# Patient Record
Sex: FEMALE | Race: WHITE | HISPANIC OR LATINO | URBAN - METROPOLITAN AREA
[De-identification: names, ages, dates, MRNs, and addresses within clinical notes are randomized per-mention and may not be internally consistent; named-entity substitution may affect disease eponyms.]

---
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
## 2024-05-17 ENCOUNTER — TELEPHONE (OUTPATIENT)
Dept: FAMILY MEDICINE CLINIC | Facility: CLINIC | Age: 19
End: 2024-05-17
Payer: COMMERCIAL

## 2024-05-17 NOTE — TELEPHONE ENCOUNTER
Caller: Cordelia Kingsley    Relationship to patient: Self    Best call back number:      [phone] (Mobile)       Chief complaint: TINGLING IN BODY AND HANDS     Patient directed to call 911 or go to their nearest emergency room.     Patient verbalized understanding: [x] Yes  [] No  If no, why?    Additional notes:  PATIENT SAID SHE WOULD GO TO THE ER TODAY 5-17-24

## 2024-06-11 ENCOUNTER — TELEPHONE (OUTPATIENT)
Dept: FAMILY MEDICINE CLINIC | Facility: CLINIC | Age: 19
End: 2024-06-11

## 2024-06-11 NOTE — TELEPHONE ENCOUNTER
Caller: Cordelia Kingsley    Relationship: Self    Best call back number: [phone]     What was the call regarding: PATIENT WAS SCHEDULED A PHYSICAL WITH KATLIN HEARN BECAUSE DR. ZULETA DID NOT HAVE ANYTHING WITHIN THE PATIENTS TIMEFRAME.

## 2024-06-18 ENCOUNTER — TELEPHONE (OUTPATIENT)
Dept: FAMILY MEDICINE CLINIC | Facility: CLINIC | Age: 19
End: 2024-06-18

## 2024-06-18 NOTE — TELEPHONE ENCOUNTER
Caller: Cordelia Kingsley    Relationship to patient: Self    Best call back number: [phone]    Chief complaint: PHYSICAL    Type of visit: PHYSICAL    Requested date: 06/19/2024     If rescheduling, when is the original appointment: 06/18/2024     Additional notes:PATIENT HAS BEEN SCHEDULED WITH A PROVIDER WHOM IS NOT PCP. PATIENT NEEDED THIS APPOINTMENT BEFORE 06/24/2024

## 2024-06-19 ENCOUNTER — LAB (OUTPATIENT)
Dept: LAB | Facility: HOSPITAL | Age: 19
End: 2024-06-19
Payer: COMMERCIAL

## 2024-06-19 ENCOUNTER — OFFICE VISIT (OUTPATIENT)
Dept: FAMILY MEDICINE CLINIC | Facility: CLINIC | Age: 19
End: 2024-06-19
Payer: COMMERCIAL

## 2024-06-19 VITALS
DIASTOLIC BLOOD PRESSURE: 86 MMHG | SYSTOLIC BLOOD PRESSURE: 118 MMHG | BODY MASS INDEX: 37.11 KG/M2 | WEIGHT: 189 LBS | OXYGEN SATURATION: 98 % | RESPIRATION RATE: 20 BRPM | HEIGHT: 60 IN | HEART RATE: 75 BPM | TEMPERATURE: 98 F

## 2024-06-19 DIAGNOSIS — R53.83 FATIGUE, UNSPECIFIED TYPE: ICD-10-CM

## 2024-06-19 DIAGNOSIS — B35.3 TINEA PEDIS OF BOTH FEET: ICD-10-CM

## 2024-06-19 DIAGNOSIS — R53.83 FATIGUE, UNSPECIFIED TYPE: Primary | ICD-10-CM

## 2024-06-19 DIAGNOSIS — Z11.1 SCREENING FOR TUBERCULOSIS: Primary | ICD-10-CM

## 2024-06-19 DIAGNOSIS — Z11.1 TUBERCULOSIS SCREENING: ICD-10-CM

## 2024-06-19 PROCEDURE — 99213 OFFICE O/P EST LOW 20 MIN: CPT | Performed by: NURSE PRACTITIONER

## 2024-06-19 PROCEDURE — 85652 RBC SED RATE AUTOMATED: CPT

## 2024-06-19 PROCEDURE — 86665 EPSTEIN-BARR CAPSID VCA: CPT

## 2024-06-19 PROCEDURE — 86664 EPSTEIN-BARR NUCLEAR ANTIGEN: CPT

## 2024-06-19 PROCEDURE — 82607 VITAMIN B-12: CPT

## 2024-06-19 PROCEDURE — 82746 ASSAY OF FOLIC ACID SERUM: CPT

## 2024-06-19 PROCEDURE — 86480 TB TEST CELL IMMUN MEASURE: CPT

## 2024-06-19 PROCEDURE — 36415 COLL VENOUS BLD VENIPUNCTURE: CPT

## 2024-06-19 RX ORDER — ETONOGESTREL 68 MG/1
68 IMPLANT SUBCUTANEOUS
COMMUNITY

## 2024-06-19 RX ORDER — CLOTRIMAZOLE AND BETAMETHASONE DIPROPIONATE 10; .64 MG/G; MG/G
1 CREAM TOPICAL 2 TIMES DAILY
Qty: 15 G | Refills: 1 | Status: SHIPPED | OUTPATIENT
Start: 2024-06-19

## 2024-06-19 NOTE — PROGRESS NOTES
Office Note     Name: Cordelia Kingsley    : 2005     MRN: [id number]     Chief Complaint  Annual Exam and Numbness (In extremities.)    Subjective     History of Present Illness:  Cordelia Kingsley is a 19 y.o. female who presents today for her annual physical. The patient reports developing fatigue in 2023. She reports numbness in her hands and lower extremities developing five weeks ago, symptoms wax and wane. Symptoms occur when sitting or lying, not when walking. Additionally, she has felt her entire body was numb three times, occurring when she woke up. Last vaccinations were in 2023. Np risk of pregnancy. Nexplanon implanted since 2021. Symptoms include sweats, chills, fatigue, new onset of diarrhea in the last two weeks, occurring three to four times per day, and mostly extremity numbness with occasional full body numbness. Paperwork completed for a job. The patient reports travel to [place] in the last year.     The patient needs     Review of Systems:   Review of Systems   Constitutional:  Positive for chills, diaphoresis and fatigue. Negative for activity change, appetite change, fever, unexpected weight gain and unexpected weight loss.   HENT: Negative.     Eyes: Negative.    Respiratory: Negative.     Cardiovascular: Negative.    Gastrointestinal:  Positive for diarrhea (diarrhea 3-4 times per day for two weeks).   Endocrine: Negative for polydipsia, polyphagia and polyuria.   Genitourinary: Negative.    Musculoskeletal:  Negative for arthralgias, gait problem, joint swelling, neck pain and neck stiffness.   Skin: Negative.    Allergic/Immunologic: Negative for environmental allergies, food allergies and immunocompromised state.   Neurological:  Positive for numbness. Negative for dizziness, tremors, seizures, syncope, weakness, light-headedness and headache.   Hematological:  Negative for adenopathy. Does not bruise/bleed easily.       Past Medical History: History  reviewed. No pertinent past medical history.    Past Surgical History: History reviewed. No pertinent surgical history.    Immunizations:   Immunization History   Administered Date(s) Administered    COVID-19 (PFIZER) Purple Cap Monovalent 08/17/2021, 09/08/2021    DTaP, Unspecified 2004, 2004, 2004    Fluzone (or Fluarix & Flulaval for VFC) >6mos 09/30/2019    Hep B / HiB 2004, 04/22/2005    Hepatitis B Adult/Adolescent IM 12/04/2023    Hib (PRP-D) 2004    IPV 2004, 2004, 04/22/2005    Meningococcal Conjugate 06/23/2020    PEDS-Pneumococcal Conjugate (PCV7) 2004, 2004, 2004    Pneumococcal Conjugate 13-Valent (PCV13) 2004, 2004, 2004    Pneumococcal Conjugate 20-Valent (PCV20) 12/04/2023        Medications:     Current Outpatient Medications:     Etonogestrel (Nexplanon) 68 MG implant subdermal implant, 1 each., Disp: , Rfl:     clotrimazole-betamethasone (LOTRISONE) 1-0.05 % cream, Apply 1 Application topically to the appropriate area as directed 2 (Two) Times a Day. Apply a thin film on both feet twice daily for two weeks., Disp: 15 g, Rfl: 1    Allergies:   Allergies   Allergen Reactions    Amoxicillin Rash    Penicillin G Rash       Family History:   Family History   Problem Relation Age of Onset    No Known Problems Mother     Diabetes Father     Hypertension Maternal Grandmother     Breast cancer Maternal Grandmother     Diabetes Maternal Grandfather     No Known Problems Paternal Grandmother     No Known Problems Paternal Grandfather        Social History:   Social History     Socioeconomic History    Marital status: Single   Tobacco Use    Smoking status: Never     Passive exposure: Never    Smokeless tobacco: Never   Vaping Use    Vaping status: Never Used   Substance and Sexual Activity    Alcohol use: Never    Drug use: Never    Sexual activity: Never     Birth control/protection: Nexplanon     Comment: pt on birth control to  "regulate periods          Objective     Vital Signs  /86 (BP Location: Left arm, Patient Position: Sitting, Cuff Size: Adult)   Pulse 75   Temp 98 °F (36.7 °C) (Infrared)   Resp 20   Ht 152.4 cm (60\")   Wt 85.7 kg (189 lb)   SpO2 98%   BMI 36.91 kg/m²   Estimated body mass index is 36.91 kg/m² as calculated from the following:    Height as of this encounter: 152.4 cm (60\").    Weight as of this encounter: 85.7 kg (189 lb).          Physical Exam  Vitals and nursing note reviewed.   Constitutional:       General: She is not in acute distress.     Appearance: Normal appearance. She is not ill-appearing or toxic-appearing.   HENT:      Head: Normocephalic and atraumatic.      Right Ear: Tympanic membrane, ear canal and external ear normal.      Left Ear: Tympanic membrane, ear canal and external ear normal.      Nose: Nose normal. No congestion or rhinorrhea.      Mouth/Throat:      Mouth: Mucous membranes are moist.      Pharynx: Oropharynx is clear.   Eyes:      General: No scleral icterus.        Right eye: No discharge.         Left eye: No discharge.      Conjunctiva/sclera: Conjunctivae normal.      Pupils: Pupils are equal, round, and reactive to light.   Cardiovascular:      Rate and Rhythm: Normal rate and regular rhythm.      Heart sounds: Normal heart sounds.   Pulmonary:      Effort: Pulmonary effort is normal.      Breath sounds: Normal breath sounds.   Abdominal:      General: Bowel sounds are normal. There is no distension.      Palpations: Abdomen is soft. There is no mass.      Tenderness: There is no abdominal tenderness. There is no guarding or rebound.      Hernia: No hernia is present.   Musculoskeletal:         General: Normal range of motion.      Cervical back: Normal range of motion and neck supple. No rigidity or tenderness.   Lymphadenopathy:      Cervical: No cervical adenopathy.   Skin:     General: Skin is warm.      Comments: The patient has peeling skin on the bottom of the " toes and between the toes.    Neurological:      General: No focal deficit present.      Mental Status: She is alert.   Psychiatric:         Mood and Affect: Mood normal.         Behavior: Behavior normal.         Thought Content: Thought content normal.         Judgment: Judgment normal.          Assessment and Plan     Procedures      Lab Results (last 72 hours)       Procedure Component Value Units Date/Time    Vitamin B12 [[id number]]  (Normal) Collected: 06/19/24 1346    Specimen: Blood Updated: 06/20/24 0027     Vitamin B-12 410 pg/mL     Narrative:      Results may be falsely increased if patient taking Biotin.      Folate [[id number]]  (Normal) Collected: 06/19/24 1346    Specimen: Blood Updated: 06/20/24 0027     Folate 8.14 ng/mL     Narrative:      Results may be falsely increased if patient taking Biotin.      Sedimentation rate, automated [[id number]]  (Normal) Collected: 06/19/24 1346    Specimen: Blood Updated: 06/20/24 0037     Sed Rate 13 mm/hr     William-Barr Virus VCA, IgM [[id number]] Collected: 06/19/24 1346    Specimen: Blood Updated: 06/19/24 1346    William-Barr Virus Nuclear Antigen Antibody, IgG [[id number]] Collected: 06/19/24 1346    Specimen: Blood Updated: 06/19/24 1346    QuantiFERON-TB Gold Plus [[id number]] Collected: 06/19/24 1346    Specimen: Blood Updated: 06/19/24 1346                  Diagnoses and all orders for this visit:    1. Fatigue, unspecified type (Primary)  -     Vitamin B12; Future  -     Folate; Future  -     Sedimentation rate, automated; Future  -     William-Barr Virus VCA, IgM; Future  -     William-Barr Virus Nuclear Antigen Antibody, IgG; Future    2. Tuberculosis screening  -     Cancel: QuantiFERON TB Gold; Future    3. Tinea pedis of both feet  -     clotrimazole-betamethasone (LOTRISONE) 1-0.05 % cream; Apply 1 Application topically to the appropriate area as directed 2 (Two) Times a Day. Apply a thin film on both feet twice daily for two weeks.  Dispense: 15 g;  Refill: 1    Reviewed exam findings with the patient and mom. The patient is agreeable to proceed with a lab draw.  Will notify her of lab results. The patient has increased heart rate while sitting during Orthostatic readings. Consider Holter monitor/echo as indicated for persistent symptoms. Keep your routine follow up appointment with Dr. Tavera.        Follow Up  Return if symptoms worsen or fail to improve.    [facility]  [address] [place] [id number] [phone]

## 2024-06-20 LAB
ERYTHROCYTE [SEDIMENTATION RATE] IN BLOOD: 13 MM/HR (ref 0–20)
FOLATE SERPL-MCNC: 8.14 NG/ML (ref 4.78–24.2)
VIT B12 BLD-MCNC: 410 PG/ML (ref 211–946)

## 2024-06-22 LAB
EBV NA IGG SER IA-ACNC: >600 U/ML (ref 0–17.9)
EBV VCA IGM SER IA-ACNC: <36 U/ML (ref 0–35.9)
GAMMA INTERFERON BACKGROUND BLD IA-ACNC: 0.03 IU/ML
M TB IFN-G BLD-IMP: NEGATIVE
M TB IFN-G CD4+ BCKGRND COR BLD-ACNC: 0.05 IU/ML
M TB IFN-G CD4+CD8+ BCKGRND COR BLD-ACNC: 0.04 IU/ML
MITOGEN IGNF BCKGRD COR BLD-ACNC: >10 IU/ML
QUANTIFERON INCUBATION: NORMAL
SERVICE CMNT-IMP: NORMAL

## 2024-06-24 ENCOUNTER — TELEPHONE (OUTPATIENT)
Dept: FAMILY MEDICINE CLINIC | Facility: CLINIC | Age: 19
End: 2024-06-24
Payer: COMMERCIAL

## 2024-06-24 NOTE — TELEPHONE ENCOUNTER
----- Message from Tae Rivera sent at 6/24/2024 12:45 PM EDT -----  I attempted to call the patient but got her voicemail. Please notify the patient that her Quantiferon TB is negative. Her EBV IGM is also negative. This test checks for acute mononucleosis. Her EBV IGG is positive, positive for past mononucleosis infection.

## 2024-06-24 NOTE — TELEPHONE ENCOUNTER
HUB RELAY    I attempted to call the patient but got her voicemail. Please notify the patient that her Quantiferon TB is negative. Her EBV IGM is also negative. This test checks for acute mononucleosis. Her EBV IGG is positive, positive for past mononucleosis infection.

## 2024-06-24 NOTE — TELEPHONE ENCOUNTER
Name: Cordelia Kingsley    Relationship: Self    Best Callback Number: [phone]     HUB PROVIDED THE RELAY MESSAGE FROM THE OFFICE   PATIENT VOICED UNDERSTANDING AND HAS NO FURTHER QUESTIONS AT THIS TIME    ADDITIONAL INFORMATION:

## 2024-06-24 NOTE — TELEPHONE ENCOUNTER
Caller: Cordelia Kingsley    Relationship: Self    Best call back number: [phone]     What was the call regarding:   PATIENT WAS SEEN IN THE OFFICE 06/19/2024 AND WOULD LIKE A CALL BACK TO BE INFORMED OF THE STATUS OF HER FORM BEING COMPLETED FOR HER TO BE ABLE TO START

## 2025-02-19 ENCOUNTER — OFFICE VISIT (OUTPATIENT)
Dept: FAMILY MEDICINE CLINIC | Facility: CLINIC | Age: 20
End: 2025-02-19
Payer: COMMERCIAL

## 2025-02-19 ENCOUNTER — LAB (OUTPATIENT)
Dept: LAB | Facility: HOSPITAL | Age: 20
End: 2025-02-19
Payer: COMMERCIAL

## 2025-02-19 VITALS
OXYGEN SATURATION: 98 % | RESPIRATION RATE: 19 BRPM | BODY MASS INDEX: 36.99 KG/M2 | DIASTOLIC BLOOD PRESSURE: 84 MMHG | SYSTOLIC BLOOD PRESSURE: 128 MMHG | HEART RATE: 77 BPM | TEMPERATURE: 98 F | WEIGHT: 189.4 LBS

## 2025-02-19 DIAGNOSIS — R20.0 NUMBNESS AND TINGLING IN BOTH HANDS: ICD-10-CM

## 2025-02-19 DIAGNOSIS — E28.2 PCOS (POLYCYSTIC OVARIAN SYNDROME): Primary | ICD-10-CM

## 2025-02-19 DIAGNOSIS — R20.2 NUMBNESS AND TINGLING IN BOTH HANDS: ICD-10-CM

## 2025-02-19 DIAGNOSIS — L50.3 URTICARIA, DERMATOGRAPHIC: ICD-10-CM

## 2025-02-19 DIAGNOSIS — E28.2 PCOS (POLYCYSTIC OVARIAN SYNDROME): ICD-10-CM

## 2025-02-19 DIAGNOSIS — G56.03 BILATERAL CARPAL TUNNEL SYNDROME: ICD-10-CM

## 2025-02-19 LAB
ALBUMIN SERPL-MCNC: 4.2 G/DL (ref 3.5–5.2)
ALBUMIN UR-MCNC: 3 MG/DL
ALBUMIN/GLOB SERPL: 1.4 G/DL
ALP SERPL-CCNC: 80 U/L (ref 39–117)
ALT SERPL W P-5'-P-CCNC: 43 U/L (ref 1–33)
ANION GAP SERPL CALCULATED.3IONS-SCNC: 11 MMOL/L (ref 5–15)
AST SERPL-CCNC: 30 U/L (ref 1–32)
BASOPHILS # BLD AUTO: 0.04 10*3/MM3 (ref 0–0.2)
BASOPHILS NFR BLD AUTO: 0.5 % (ref 0–1.5)
BILIRUB SERPL-MCNC: 0.5 MG/DL (ref 0–1.2)
BUN SERPL-MCNC: 7 MG/DL (ref 6–20)
BUN/CREAT SERPL: 10.4 (ref 7–25)
CALCIUM SPEC-SCNC: 9.1 MG/DL (ref 8.6–10.5)
CHLORIDE SERPL-SCNC: 104 MMOL/L (ref 98–107)
CO2 SERPL-SCNC: 24 MMOL/L (ref 22–29)
CREAT SERPL-MCNC: 0.67 MG/DL (ref 0.57–1)
DEPRECATED RDW RBC AUTO: 41 FL (ref 37–54)
EGFRCR SERPLBLD CKD-EPI 2021: 128.5 ML/MIN/1.73
EOSINOPHIL # BLD AUTO: 0.11 10*3/MM3 (ref 0–0.4)
EOSINOPHIL NFR BLD AUTO: 1.3 % (ref 0.3–6.2)
ERYTHROCYTE [DISTWIDTH] IN BLOOD BY AUTOMATED COUNT: 12.6 % (ref 12.3–15.4)
GLOBULIN UR ELPH-MCNC: 2.9 GM/DL
GLUCOSE SERPL-MCNC: 107 MG/DL (ref 65–99)
HBA1C MFR BLD: 6 % (ref 4.8–5.6)
HCT VFR BLD AUTO: 40.6 % (ref 34–46.6)
HGB BLD-MCNC: 14 G/DL (ref 12–15.9)
IMM GRANULOCYTES # BLD AUTO: 0.02 10*3/MM3 (ref 0–0.05)
IMM GRANULOCYTES NFR BLD AUTO: 0.2 % (ref 0–0.5)
LYMPHOCYTES # BLD AUTO: 2.71 10*3/MM3 (ref 0.7–3.1)
LYMPHOCYTES NFR BLD AUTO: 31 % (ref 19.6–45.3)
MCH RBC QN AUTO: 30.8 PG (ref 26.6–33)
MCHC RBC AUTO-ENTMCNC: 34.5 G/DL (ref 31.5–35.7)
MCV RBC AUTO: 89.4 FL (ref 79–97)
MONOCYTES # BLD AUTO: 0.47 10*3/MM3 (ref 0.1–0.9)
MONOCYTES NFR BLD AUTO: 5.4 % (ref 5–12)
NEUTROPHILS NFR BLD AUTO: 5.4 10*3/MM3 (ref 1.7–7)
NEUTROPHILS NFR BLD AUTO: 61.6 % (ref 42.7–76)
NRBC BLD AUTO-RTO: 0 /100 WBC (ref 0–0.2)
PLATELET # BLD AUTO: 375 10*3/MM3 (ref 140–450)
PMV BLD AUTO: 11.8 FL (ref 6–12)
POTASSIUM SERPL-SCNC: 3.5 MMOL/L (ref 3.5–5.2)
PROT SERPL-MCNC: 7.1 G/DL (ref 6–8.5)
RBC # BLD AUTO: 4.54 10*6/MM3 (ref 3.77–5.28)
SODIUM SERPL-SCNC: 139 MMOL/L (ref 136–145)
TSH SERPL DL<=0.05 MIU/L-ACNC: 2.12 UIU/ML (ref 0.27–4.2)
VIT B12 BLD-MCNC: 418 PG/ML (ref 211–946)
WBC NRBC COR # BLD AUTO: 8.75 10*3/MM3 (ref 3.4–10.8)

## 2025-02-19 PROCEDURE — 82043 UR ALBUMIN QUANTITATIVE: CPT

## 2025-02-19 PROCEDURE — 83036 HEMOGLOBIN GLYCOSYLATED A1C: CPT

## 2025-02-19 PROCEDURE — 82607 VITAMIN B-12: CPT

## 2025-02-19 PROCEDURE — 80050 GENERAL HEALTH PANEL: CPT

## 2025-02-19 PROCEDURE — 99214 OFFICE O/P EST MOD 30 MIN: CPT | Performed by: FAMILY MEDICINE

## 2025-02-19 RX ORDER — NAPROXEN 500 MG/1
500 TABLET ORAL
COMMUNITY
Start: 2025-01-27

## 2025-02-19 RX ORDER — NORGESTIMATE AND ETHINYL ESTRADIOL 0.25-0.035
KIT ORAL
COMMUNITY
Start: 2025-01-28

## 2025-02-19 RX ORDER — FAMOTIDINE 20 MG/1
20 TABLET, FILM COATED ORAL 2 TIMES DAILY
Qty: 30 TABLET | Refills: 3 | Status: SHIPPED | OUTPATIENT
Start: 2025-02-19

## 2025-02-19 RX ORDER — CETIRIZINE HYDROCHLORIDE 10 MG/1
10 TABLET ORAL DAILY
Qty: 30 TABLET | Refills: 3 | Status: SHIPPED | OUTPATIENT
Start: 2025-02-19

## 2025-02-19 NOTE — PROGRESS NOTES
Subjective   Cordelia Kingsley is a 19 y.o. female.     History of Present Illness hives come and goes since was started on spirnolactone.  She has tried benadryl as needed.  Makes her sleepy.  She has been off for .  Linear hives and scratches.  Tingling sensation in fingers.  Also pain in right arm and leg.  She saw gyn and was diagnosed with pcos.  She is also always tired.  She has been taking sprintec since October.  Started spirolactone 1 month ago.  No shortness of breath or tongue or lip swelling.  She is also fatigued despite 8 hours of sleep.  Unsure if she snores.  Has been on ocp for irreg periods. No change in ocp.      The following portions of the patient's history were reviewed and updated as appropriate: allergies, current medications, past family history, past medical history, past social history, past surgical history, and problem list.    Review of Systems   Constitutional:  Positive for fatigue. Negative for chills, fever and unexpected weight change.   HENT:  Negative for congestion, ear pain, nosebleeds, rhinorrhea, sinus pressure, sneezing, sore throat and trouble swallowing.    Eyes:  Negative for itching and visual disturbance.   Respiratory:  Negative for cough, chest tightness, shortness of breath and wheezing.    Cardiovascular:  Negative for chest pain, palpitations and leg swelling.   Gastrointestinal:  Negative for abdominal pain, anal bleeding, blood in stool, constipation, diarrhea, nausea and vomiting.   Endocrine: Negative for cold intolerance, heat intolerance, polydipsia and polyuria.   Genitourinary:  Negative for difficulty urinating, frequency, hematuria and urgency.   Musculoskeletal:  Negative for back pain, gait problem and myalgias.   Skin:  Positive for rash. Negative for wound.   Neurological:  Positive for numbness. Negative for dizziness, weakness and headaches.   Hematological:  Negative for adenopathy. Does not bruise/bleed easily.   Psychiatric/Behavioral:  Negative  for agitation, confusion, decreased concentration and suicidal ideas. The patient is not nervous/anxious.        Objective     Vitals:    02/19/25 1255   BP: 128/84   BP Location: Left arm   Patient Position: Sitting   Cuff Size: Adult   Pulse: 77   Resp: 19   Temp: 98 °F (36.7 °C)   TempSrc: Infrared   SpO2: 98%   Weight: 85.9 kg (189 lb 6.4 oz)       Physical Exam  Vitals and nursing note reviewed.   Constitutional:       General: She is not in acute distress.     Appearance: She is well-developed. She is not diaphoretic.   HENT:      Head: Normocephalic and atraumatic.      Right Ear: External ear normal.      Left Ear: External ear normal.   Eyes:      General: Lids are normal. No scleral icterus.        Right eye: No discharge.         Left eye: No discharge.      Conjunctiva/sclera: Conjunctivae normal.      Pupils: Pupils are equal, round, and reactive to light.   Neck:      Thyroid: No thyroid mass or thyromegaly.      Vascular: No carotid bruit or JVD.      Trachea: No tracheal deviation.   Cardiovascular:      Rate and Rhythm: Normal rate and regular rhythm.      Heart sounds: Normal heart sounds. No murmur heard.     No friction rub. No gallop.   Pulmonary:      Effort: Pulmonary effort is normal. No respiratory distress.      Breath sounds: Normal breath sounds. No decreased breath sounds, wheezing, rhonchi or rales.   Chest:      Chest wall: No tenderness.   Abdominal:      General: Bowel sounds are normal. There is no distension.      Palpations: Abdomen is soft. There is no mass.      Tenderness: There is no abdominal tenderness. There is no guarding or rebound.      Hernia: No hernia is present.   Musculoskeletal:         General: No tenderness, deformity or signs of injury. Normal range of motion.      Comments: Tinnel + bilaterally.     Lymphadenopathy:      Cervical: No cervical adenopathy.      Upper Body:      Right upper body: No supraclavicular adenopathy.      Left upper body: No  supraclavicular adenopathy.   Skin:     Findings: No bruising, erythema or rash.      Nails: There is no clubbing.   Neurological:      Mental Status: She is alert and oriented to person, place, and time.      Cranial Nerves: No cranial nerve deficit.      Deep Tendon Reflexes: Reflexes are normal and symmetric. Reflexes normal.   Psychiatric:         Speech: Speech normal.         Behavior: Behavior normal.         Thought Content: Thought content normal.         Judgment: Judgment normal.         Assessment & Plan     Problem List Items Addressed This Visit          Endocrine and Metabolic    PCOS (polycystic ovarian syndrome) - Primary    Relevant Orders    CBC & Differential    TSH    Comprehensive Metabolic Panel    Hemoglobin A1c    MicroAlbumin, Urine, Random - Urine, Clean Catch    Lipid Panel    Vitamin D,25-Hydroxy       Neuro    Bilateral carpal tunnel syndrome       Skin    Urticaria, dermatographic    Relevant Medications    famotidine (Pepcid) 20 MG tablet    cetirizine (zyrTEC) 10 MG tablet    Other Relevant Orders    Ambulatory Referral to Allergy       Symptoms and Signs    Numbness and tingling in both hands    Relevant Orders    Vitamin B12     We may need to given some time for spirnolactone to clear system.    Consider hormonal   Start pepcid and zyrtec continue benadryl as needed.    Check fasting labs.    Carpal tunnel splints.   Check labs b12 vit d.  Check A1c electrolytes. Thyroid.   Referral allergy  Could be hormonal related.

## 2025-03-07 ENCOUNTER — LAB (OUTPATIENT)
Dept: LAB | Facility: HOSPITAL | Age: 20
End: 2025-03-07
Payer: COMMERCIAL

## 2025-03-07 ENCOUNTER — TRANSCRIBE ORDERS (OUTPATIENT)
Dept: LAB | Facility: HOSPITAL | Age: 20
End: 2025-03-07
Payer: COMMERCIAL

## 2025-03-07 DIAGNOSIS — L50.1 IDIOPATHIC URTICARIA: Primary | ICD-10-CM

## 2025-03-07 DIAGNOSIS — L50.8 AQUAGENIC URTICARIA: ICD-10-CM

## 2025-03-07 LAB
ALBUMIN SERPL-MCNC: 4.2 G/DL (ref 3.5–5.2)
ALBUMIN/GLOB SERPL: 1.6 G/DL
ALP SERPL-CCNC: 75 U/L (ref 39–117)
ALT SERPL W P-5'-P-CCNC: 93 U/L (ref 1–33)
ANION GAP SERPL CALCULATED.3IONS-SCNC: 12 MMOL/L (ref 5–15)
AST SERPL-CCNC: 49 U/L (ref 1–32)
BASOPHILS # BLD AUTO: 0.04 10*3/MM3 (ref 0–0.2)
BASOPHILS NFR BLD AUTO: 0.5 % (ref 0–1.5)
BILIRUB SERPL-MCNC: 0.3 MG/DL (ref 0–1.2)
BUN SERPL-MCNC: 7 MG/DL (ref 6–20)
BUN/CREAT SERPL: 11.5 (ref 7–25)
CALCIUM SPEC-SCNC: 9.1 MG/DL (ref 8.6–10.5)
CHLORIDE SERPL-SCNC: 105 MMOL/L (ref 98–107)
CHROMATIN AB SERPL-ACNC: <10 IU/ML (ref 0–14)
CO2 SERPL-SCNC: 24 MMOL/L (ref 22–29)
CREAT SERPL-MCNC: 0.61 MG/DL (ref 0.57–1)
CRP SERPL-MCNC: 0.64 MG/DL (ref 0–0.5)
DEPRECATED RDW RBC AUTO: 42.5 FL (ref 37–54)
EGFRCR SERPLBLD CKD-EPI 2021: 131.4 ML/MIN/1.73
EOSINOPHIL # BLD AUTO: 0.19 10*3/MM3 (ref 0–0.4)
EOSINOPHIL NFR BLD AUTO: 2.3 % (ref 0.3–6.2)
ERYTHROCYTE [DISTWIDTH] IN BLOOD BY AUTOMATED COUNT: 12.6 % (ref 12.3–15.4)
ERYTHROCYTE [SEDIMENTATION RATE] IN BLOOD: 9 MM/HR (ref 0–20)
GLOBULIN UR ELPH-MCNC: 2.7 GM/DL
GLUCOSE SERPL-MCNC: 93 MG/DL (ref 65–99)
HCT VFR BLD AUTO: 40.7 % (ref 34–46.6)
HGB BLD-MCNC: 13.7 G/DL (ref 12–15.9)
IMM GRANULOCYTES # BLD AUTO: 0.02 10*3/MM3 (ref 0–0.05)
IMM GRANULOCYTES NFR BLD AUTO: 0.2 % (ref 0–0.5)
LYMPHOCYTES # BLD AUTO: 3.03 10*3/MM3 (ref 0.7–3.1)
LYMPHOCYTES NFR BLD AUTO: 37.2 % (ref 19.6–45.3)
MCH RBC QN AUTO: 31 PG (ref 26.6–33)
MCHC RBC AUTO-ENTMCNC: 33.7 G/DL (ref 31.5–35.7)
MCV RBC AUTO: 92.1 FL (ref 79–97)
MONOCYTES # BLD AUTO: 0.37 10*3/MM3 (ref 0.1–0.9)
MONOCYTES NFR BLD AUTO: 4.5 % (ref 5–12)
NEUTROPHILS NFR BLD AUTO: 4.49 10*3/MM3 (ref 1.7–7)
NEUTROPHILS NFR BLD AUTO: 55.3 % (ref 42.7–76)
NRBC BLD AUTO-RTO: 0 /100 WBC (ref 0–0.2)
PLATELET # BLD AUTO: 349 10*3/MM3 (ref 140–450)
PMV BLD AUTO: 11.7 FL (ref 6–12)
POTASSIUM SERPL-SCNC: 4 MMOL/L (ref 3.5–5.2)
PROT SERPL-MCNC: 6.9 G/DL (ref 6–8.5)
RBC # BLD AUTO: 4.42 10*6/MM3 (ref 3.77–5.28)
SODIUM SERPL-SCNC: 141 MMOL/L (ref 136–145)
T3 SERPL-MCNC: 178 NG/DL (ref 80–200)
T4 SERPL-MCNC: 11.7 MCG/DL (ref 4.5–11.7)
TSH SERPL DL<=0.05 MIU/L-ACNC: 1.57 UIU/ML (ref 0.27–4.2)
WBC NRBC COR # BLD AUTO: 8.14 10*3/MM3 (ref 3.4–10.8)

## 2025-03-07 PROCEDURE — 84436 ASSAY OF TOTAL THYROXINE: CPT | Performed by: NURSE PRACTITIONER

## 2025-03-07 PROCEDURE — 84480 ASSAY TRIIODOTHYRONINE (T3): CPT | Performed by: NURSE PRACTITIONER

## 2025-03-07 PROCEDURE — 86800 THYROGLOBULIN ANTIBODY: CPT | Performed by: NURSE PRACTITIONER

## 2025-03-07 PROCEDURE — 86038 ANTINUCLEAR ANTIBODIES: CPT | Performed by: NURSE PRACTITIONER

## 2025-03-07 PROCEDURE — 86431 RHEUMATOID FACTOR QUANT: CPT | Performed by: NURSE PRACTITIONER

## 2025-03-07 PROCEDURE — 86140 C-REACTIVE PROTEIN: CPT | Performed by: NURSE PRACTITIONER

## 2025-03-07 PROCEDURE — 80050 GENERAL HEALTH PANEL: CPT | Performed by: NURSE PRACTITIONER

## 2025-03-07 PROCEDURE — 36415 COLL VENOUS BLD VENIPUNCTURE: CPT | Performed by: NURSE PRACTITIONER

## 2025-03-07 PROCEDURE — 86352 CELL FUNCTION ASSAY W/STIM: CPT | Performed by: NURSE PRACTITIONER

## 2025-03-07 PROCEDURE — 86376 MICROSOMAL ANTIBODY EACH: CPT | Performed by: NURSE PRACTITIONER

## 2025-03-07 PROCEDURE — 82785 ASSAY OF IGE: CPT | Performed by: NURSE PRACTITIONER

## 2025-03-07 PROCEDURE — 85652 RBC SED RATE AUTOMATED: CPT | Performed by: NURSE PRACTITIONER

## 2025-03-10 LAB
ANA SER QL: NEGATIVE
THYROGLOB AB SERPL-ACNC: <1 IU/ML (ref 0–0.9)
THYROPEROXIDASE AB SERPL-ACNC: 11 IU/ML (ref 0–34)

## 2025-03-12 ENCOUNTER — RESULTS FOLLOW-UP (OUTPATIENT)
Dept: FAMILY MEDICINE CLINIC | Facility: CLINIC | Age: 20
End: 2025-03-12

## 2025-03-14 LAB — IGE SERPL-ACNC: 198 IU/ML (ref 6–495)

## 2025-03-18 LAB — FC EPSILON RI + RII AB SER-ACNC: 6.6

## 2025-04-02 ENCOUNTER — OFFICE VISIT (OUTPATIENT)
Dept: FAMILY MEDICINE CLINIC | Facility: CLINIC | Age: 20
End: 2025-04-02
Payer: COMMERCIAL

## 2025-04-02 ENCOUNTER — LAB (OUTPATIENT)
Dept: LAB | Facility: HOSPITAL | Age: 20
End: 2025-04-02
Payer: COMMERCIAL

## 2025-04-02 VITALS
WEIGHT: 186 LBS | OXYGEN SATURATION: 98 % | SYSTOLIC BLOOD PRESSURE: 112 MMHG | TEMPERATURE: 98.6 F | BODY MASS INDEX: 36.52 KG/M2 | DIASTOLIC BLOOD PRESSURE: 70 MMHG | HEIGHT: 60 IN | HEART RATE: 86 BPM

## 2025-04-02 DIAGNOSIS — E66.812 CLASS 2 OBESITY DUE TO EXCESS CALORIES WITHOUT SERIOUS COMORBIDITY WITH BODY MASS INDEX (BMI) OF 36.0 TO 36.9 IN ADULT: Primary | ICD-10-CM

## 2025-04-02 DIAGNOSIS — G89.29 CHRONIC PAIN OF BOTH KNEES: ICD-10-CM

## 2025-04-02 DIAGNOSIS — Z51.81 THERAPEUTIC DRUG MONITORING: ICD-10-CM

## 2025-04-02 DIAGNOSIS — E66.09 CLASS 2 OBESITY DUE TO EXCESS CALORIES WITHOUT SERIOUS COMORBIDITY WITH BODY MASS INDEX (BMI) OF 36.0 TO 36.9 IN ADULT: Primary | ICD-10-CM

## 2025-04-02 DIAGNOSIS — M25.561 CHRONIC PAIN OF BOTH KNEES: ICD-10-CM

## 2025-04-02 DIAGNOSIS — F51.01 PRIMARY INSOMNIA: ICD-10-CM

## 2025-04-02 DIAGNOSIS — G47.19 EXCESSIVE DAYTIME SLEEPINESS: ICD-10-CM

## 2025-04-02 DIAGNOSIS — M25.562 CHRONIC PAIN OF BOTH KNEES: ICD-10-CM

## 2025-04-02 PROCEDURE — 36415 COLL VENOUS BLD VENIPUNCTURE: CPT

## 2025-04-02 PROCEDURE — 80307 DRUG TEST PRSMV CHEM ANLYZR: CPT

## 2025-04-02 RX ORDER — PHENTERMINE HYDROCHLORIDE 15 MG/1
15 CAPSULE ORAL EVERY MORNING
Qty: 30 CAPSULE | Refills: 2 | Status: SHIPPED | OUTPATIENT
Start: 2025-04-02

## 2025-04-02 RX ORDER — FEXOFENADINE HCL 180 MG/1
180 TABLET ORAL 3 TIMES DAILY
COMMUNITY

## 2025-04-02 RX ORDER — TRAZODONE HYDROCHLORIDE 50 MG/1
50 TABLET ORAL NIGHTLY
Qty: 30 TABLET | Refills: 2 | Status: SHIPPED | OUTPATIENT
Start: 2025-04-02

## 2025-04-02 NOTE — PROGRESS NOTES
Follow Up Office Visit      Patient Name: Cordelia Kingsley  : 2005   MRN: [id number]     Chief Complaint:    Chief Complaint   Patient presents with    Joint Pain     Pt states that her joints are hurting everyday  Started a couple weeks ago  Muscle feel like they are being pulled  Right leg is the worst, back calf and thigh    Foot Swelling     She has noticed that her feet are swelling   She is having to wear bigger shoes    Insomnia     She states that she never gets in her REM cycle  She states that she is still able to hear and she is aware of every noise and sound  She states that no matter how long she sleeps that she is staying very tired       History of Present Illness: Cordelia Kingsley is a 20 y.o. female who is here today to follow up with joint pain, foot swelling, insomnia.     Joint hurting ever day for weeks. Both knees and back of legs hurt. She says right is worse. No increased activity. Worse in evening and night time. Feels discomfort throughout day. Stairs make it worse. Massage helps. Midol helps.     Foot swelling comes and goes. Sits for job. Works in office. Feet can get tight. Comes and goes. Can get better overnight. She reports a cramp in the back of her leg. No extra exercise recently due to increased work load.     Has poor sleep. Insomnia and excessive daytime sleepiness. No apneic spells. Last few months this has been an issue. She is tired every day.       Physical exam: Active and passive range of motion of the knees show slight J sign bilaterally.  No edema noted in the ankle or lower leg.  Mood and affect appropriate      Subjective        I have reviewed and the following portions of the patient's history were updated as appropriate: past family history, past medical history, past social history, past surgical history and problem list.    Medications:     Current Outpatient Medications:     famotidine (Pepcid) 20 MG tablet, Take 1 tablet by mouth 2 (Two) Times a Day.,  "Disp: 30 tablet, Rfl: 3    fexofenadine (ALLEGRA) 180 MG tablet, Take 1 tablet by mouth 3 (Three) Times a Day., Disp: , Rfl:     naproxen (NAPROSYN) 500 MG tablet, Take 1 tablet by mouth., Disp: , Rfl:     Sprintec 28 0.25-35 MG-MCG per tablet, , Disp: , Rfl:     phentermine 15 MG capsule, Take 1 capsule by mouth Every Morning., Disp: 30 capsule, Rfl: 2    traZODone (DESYREL) 50 MG tablet, Take 1 tablet by mouth Every Night., Disp: 30 tablet, Rfl: 2    Allergies:   Allergies   Allergen Reactions    Amoxicillin Rash    Penicillin G Rash       Objective     Physical Exam: Please see above  Vital Signs:   Vitals:    04/02/25 1314   BP: 112/70   Pulse: 86   Temp: 98.6 °F (37 °C)   TempSrc: Infrared   SpO2: 98%   Weight: 84.4 kg (186 lb)   Height: 152.4 cm (60\")   PainSc: 2      Body mass index is 36.33 kg/m².          Assessment / Plan      Assessment/Plan:   Diagnoses and all orders for this visit:    1. Class 2 obesity due to excess calories without serious comorbidity with body mass index (BMI) of 36.0 to 36.9 in adult (Primary)  -     phentermine 15 MG capsule; Take 1 capsule by mouth Every Morning.  Dispense: 30 capsule; Refill: 2    2. Therapeutic drug monitoring  -     Drug screen panel 1, serum; Future    3. Excessive daytime sleepiness  -     Ambulatory Referral to Sleep Medicine    4. Chronic pain of both knees  -     Ambulatory Referral to Physical Therapy for Evaluation & Treatment    5. Primary insomnia  -     traZODone (DESYREL) 50 MG tablet; Take 1 tablet by mouth Every Night.  Dispense: 30 tablet; Refill: 2    For insomnia-likely this is just primari insomnia or could be related to sleep apnea.  Try 50 to 100 mg to see if this helps.  In the future we can consider doxepin, Elavil or mirtazapine    For her knee pain, I think this is patellofemoral syndrome.  Recommend PT and appreciate any further recommendations.    For her excessive daytime sleepiness, I would like to get her tested for sleep apnea just " to make sure not missing an organic cause of this issue.    We discussed her weight today and losing weight options include phentermine, diet and exercise, and Wegovy.  I recommend that she start exercising regularly in the form of strength training and watching her diet.  Will give her phentermine at a low dose to see if this can help curb her diet.  Follow-up in 1 to 3 months depending on how effective it is.  We can double the dose if she calls for refill next month and if it is not effective.    Drug screen contract discussed with patient.    Today we reviewed and discussed the patient's controlled substance.  We reviewed their [place] report, previous drug screens, and drug contract.  They have a drug contract and drug screen on file that is current.  They are compliant with follow-ups every 3 months, and will continue to be compliant per the drug contract.     Follow Up:   Return in about 3 months (around 7/2/2025) for weight management, Labs today.    Josh Tavera, DO  [facility]

## 2025-04-04 ENCOUNTER — TELEPHONE (OUTPATIENT)
Dept: FAMILY MEDICINE CLINIC | Facility: CLINIC | Age: 20
End: 2025-04-04
Payer: COMMERCIAL

## 2025-04-09 LAB
AMPHETAMINES SERPL QL SCN: NEGATIVE NG/ML
BARBITURATES SERPL QL SCN: NEGATIVE UG/ML
BENZODIAZ SERPL QL SCN: NEGATIVE NG/ML
CANNABINOIDS SERPL QL SCN: NEGATIVE NG/ML
COCAINE+BZE SERPL QL SCN: NEGATIVE NG/ML
METHADONE SERPL QL SCN: NEGATIVE NG/ML
OPIATES SERPL QL SCN: NEGATIVE NG/ML
OXYCODONE+OXYMORPHONE SERPLBLD QL SCN: NEGATIVE NG/ML
PCP SERPL QL SCN: NEGATIVE NG/ML
PROPOXYPH SERPL QL SCN: NEGATIVE NG/ML

## 2025-04-21 ENCOUNTER — TREATMENT (OUTPATIENT)
Dept: PHYSICAL THERAPY | Facility: CLINIC | Age: 20
End: 2025-04-21
Payer: COMMERCIAL

## 2025-04-21 DIAGNOSIS — G89.29 CHRONIC PAIN OF BOTH KNEES: Primary | ICD-10-CM

## 2025-04-21 DIAGNOSIS — M25.562 CHRONIC PAIN OF BOTH KNEES: Primary | ICD-10-CM

## 2025-04-21 DIAGNOSIS — M25.561 CHRONIC PAIN OF BOTH KNEES: Primary | ICD-10-CM

## 2025-04-21 PROCEDURE — 97161 PT EVAL LOW COMPLEX 20 MIN: CPT

## 2025-04-21 PROCEDURE — 97530 THERAPEUTIC ACTIVITIES: CPT

## 2025-04-21 NOTE — PROGRESS NOTES
Physical Therapy Initial Evaluation and Plan of Care  [facility] [place]  [address], [address]  [place] [address]        Patient: Cordelia Kingsley   : 2005  Diagnosis/ICD-10 Code:  Chronic pain of both knees [M25.561, M25.562, G89.29]  Referring practitioner: Josh Tavera DO  Date of Initial Visit: 2025  Today's Date: 2025  Patient seen for 1 sessions         Visit Diagnoses:    ICD-10-CM ICD-9-CM   1. Chronic pain of both knees  M25.561 719.46    M25.562 338.29    G89.29          Subjective Questionnaire: LEFS: 70/80     Subjective Evaluation    History of Present Illness  Mechanism of injury: 2-3 months of leg pain feels like someone is pulling on them and that her bones are rubbing on each other. Right worse than left.  Sits during the day due to her job  Starts at her distal quads and travels down to her calves. Laying down doesn't provide any relief  Massage with an oil/lotion that gives her some relief  No specific aggravating factor - occasional sharp pain going up stairs.    Access Code: [email or web address]  URL: [email or web address]  Date: 2025  Prepared by: Mariaa Lomas    Exercises  - Seated Hamstring Stretch  - 2 x daily - 7 x weekly - 10 reps - 10-15 se hold  - Seated Calf Stretch with Strap  - 2 x daily - 7 x weekly - 10 reps - 10-15 sec hold  - Prone Quadriceps Stretch with Strap  - 1 x daily - 7 x weekly - 10 reps - 10-15 sec hold  - Long Sitting Quad Set  - 1 x daily - 7 x weekly - 2 sets - 15 reps      Patient Occupation: works with the [employer]  Current pain ratin  At best pain ratin  At worst pain ratin  Quality: tight, discomfort, grinding and cramping  Aggravating factors: sleeping, ambulation and prolonged positioning  Progression: no change           Objective          Palpation   Left   Hypertonic in the distal biceps femoris, distal semimembranosus, distal semitendinosus, lateral gastrocnemius and medial gastrocnemius.     Right  "  Hypertonic in the distal biceps femoris, distal semimembranosus, distal semitendinosus, lateral gastrocnemius and medial gastrocnemius.     Tenderness     Additional Tenderness Details  No TTP of R and L knee structures    Active Range of Motion     Additional Active Range of Motion Details  AROM  L 11-0-120  R 11-0-120    Passive Range of Motion     Additional Passive Range of Motion Details  PROM  L 11-0-120  R 11-0-120    Strength/Myotome Testing     Left Knee   Flexion: 4-  Extension: 4-  Quadriceps contraction: fair    Right Knee   Flexion: 4-  Extension: 4-  Quadriceps contraction: fair    Ambulation     Quality of Movement During Gait     Ankle    Ankle (Left): Positive pronated.   Ankle (Right): Positive pronated.     Comments   Decreased knee flexion with swing phase bilaterally  Hyperextension in standing and stance phase bilaterally    Functional Assessment     Forward Step Down 8\"   Left Leg  Valgus.     Right Leg  Valgus.           Assessment & Plan       Assessment  Impairments: abnormal gait, abnormal muscle firing, abnormal muscle tone, activity intolerance, lacks appropriate home exercise program, pain with function and weight-bearing intolerance   Functional limitations: sleeping, walking, uncomfortable because of pain, moving in bed, sitting, standing and stooping   Assessment details: 20 year old female presents with bilateral anterior and posterior knee pain that extends posteriorly down her calves. Patient presents to clinic with chronic bilateral knee pain, quadriceps weakness, decreased quadriceps motor control, muscular tightness and gait abnormalities. Patient demonstrated understanding of HEP focused on muscular tightness and quadriceps motor control for dynamic stability of knees. Patient will require skilled physical therapy to address these deficits.  Prognosis: good    Goals  Plan Goals: To be met in 8 weeks:     1. The patient will be independent and compliant with initial HEP.    "   2. The patient will report pain at rest 0/10 or less and worst pain 3/10 or less.     3. LEFS will improve by 5 points or more.      4. The patient will demonstrate good VMO activation evidenced by performance of a SLR with 3# and no extensor lag.     5. The patient will return to work duties and/or ADLs with no limitations due to knee pain or dysfunction.     6. The patient will return to recreational and community activities with no limitations due to knee pain or dysfunction.      Plan  Therapy options: will be seen for skilled therapy services  Planned modality interventions: cryotherapy, dry needling, electrical stimulation/Russian stimulation, high voltage pulsed current (pain management), thermotherapy (hydrocollator packs), ultrasound and TENS  Planned therapy interventions: balance/weight-bearing training, functional ROM exercises, gait training, home exercise program, joint mobilization, manual therapy, motor coordination training, neuromuscular re-education, soft tissue mobilization, strengthening, stretching, therapeutic activities and transfer training  Treatment plan discussed with: patient  Plan details: Will see her back in 1 month to reassess and progress as needed      Timed:         Manual Therapy:         mins  22466;     Therapeutic Exercise:         mins  07515;     Neuromuscular Tamiko:        mins  97268;    Therapeutic Activity:     25     mins  51227;     Gait Training:           mins  37381;     Ultrasound:          mins  38057;    Ionto                                   mins   72675  Self Care                            mins   67797  Canalith Repos         mins 75175      Un-Timed:  Electrical Stimulation:         mins  74599 ( );  Dry Needling          mins self-pay  Traction          mins 61695  Low Eval     15     Mins  79398  Mod Eval          Mins  54027  High Eval                            Mins  89197        Timed Treatment:   25   mins   Total Treatment:     40    mins      PT: Mariaa Lomas PT     License Number: [id number]  Electronically signed by Mariaa Lomas PT, 04/21/25, 7:07 AM EDT    Initial Certification  Certification Period: 7/20/2025  I certify that the therapy services are furnished while this patient is under my care.  The services outlined above are required by this patient, and will be reviewed every 90 days.     PHYSICIAN: ________________________________________________________  Josh Tavera DO        DATE: ____________________________________________________________    Please sign and return via fax to @[phone]@.. Thank you, [facility].

## 2025-04-22 ENCOUNTER — APPOINTMENT (OUTPATIENT)
Dept: LAB | Facility: HOSPITAL | Age: 20
End: 2025-04-22
Payer: COMMERCIAL

## 2025-04-22 ENCOUNTER — OFFICE VISIT (OUTPATIENT)
Dept: FAMILY MEDICINE CLINIC | Facility: CLINIC | Age: 20
End: 2025-04-22
Payer: COMMERCIAL

## 2025-04-22 ENCOUNTER — LAB (OUTPATIENT)
Dept: LAB | Facility: HOSPITAL | Age: 20
End: 2025-04-22
Payer: COMMERCIAL

## 2025-04-22 ENCOUNTER — TRANSCRIBE ORDERS (OUTPATIENT)
Dept: LAB | Facility: HOSPITAL | Age: 20
End: 2025-04-22
Payer: COMMERCIAL

## 2025-04-22 VITALS
WEIGHT: 186 LBS | HEIGHT: 60 IN | DIASTOLIC BLOOD PRESSURE: 82 MMHG | OXYGEN SATURATION: 99 % | TEMPERATURE: 98 F | HEART RATE: 91 BPM | BODY MASS INDEX: 36.52 KG/M2 | SYSTOLIC BLOOD PRESSURE: 120 MMHG

## 2025-04-22 DIAGNOSIS — L50.1 IDIOPATHIC URTICARIA: ICD-10-CM

## 2025-04-22 DIAGNOSIS — L50.9 URTICARIA: ICD-10-CM

## 2025-04-22 DIAGNOSIS — E28.2 PCOS (POLYCYSTIC OVARIAN SYNDROME): ICD-10-CM

## 2025-04-22 DIAGNOSIS — L50.8 OTHER URTICARIA: ICD-10-CM

## 2025-04-22 DIAGNOSIS — R74.01 ELEVATED TRANSAMINASE LEVEL: ICD-10-CM

## 2025-04-22 DIAGNOSIS — L50.9 URTICARIA: Primary | ICD-10-CM

## 2025-04-22 DIAGNOSIS — R74.01 ELEVATED TRANSAMINASE LEVEL: Primary | ICD-10-CM

## 2025-04-22 LAB
25(OH)D3 SERPL-MCNC: 9.4 NG/ML (ref 30–100)
ALBUMIN SERPL-MCNC: 4.4 G/DL (ref 3.5–5.2)
ALBUMIN SERPL-MCNC: 4.4 G/DL (ref 3.5–5.2)
ALBUMIN/GLOB SERPL: 1.5 G/DL
ALBUMIN/GLOB SERPL: 1.5 G/DL
ALP SERPL-CCNC: 83 U/L (ref 39–117)
ALP SERPL-CCNC: 83 U/L (ref 39–117)
ALT SERPL W P-5'-P-CCNC: 49 U/L (ref 1–33)
ALT SERPL W P-5'-P-CCNC: 50 U/L (ref 1–33)
ANION GAP SERPL CALCULATED.3IONS-SCNC: 7.1 MMOL/L (ref 5–15)
ANION GAP SERPL CALCULATED.3IONS-SCNC: 9.2 MMOL/L (ref 5–15)
AST SERPL-CCNC: 33 U/L (ref 1–32)
AST SERPL-CCNC: 34 U/L (ref 1–32)
BASOPHILS # BLD AUTO: 0.03 10*3/MM3 (ref 0–0.2)
BASOPHILS NFR BLD AUTO: 0.3 % (ref 0–1.5)
BILIRUB SERPL-MCNC: 0.4 MG/DL (ref 0–1.2)
BILIRUB SERPL-MCNC: 0.4 MG/DL (ref 0–1.2)
BUN SERPL-MCNC: 6 MG/DL (ref 6–20)
BUN SERPL-MCNC: 6 MG/DL (ref 6–20)
BUN/CREAT SERPL: 8.1 (ref 7–25)
BUN/CREAT SERPL: 8.6 (ref 7–25)
CALCIUM SPEC-SCNC: 9.6 MG/DL (ref 8.6–10.5)
CALCIUM SPEC-SCNC: 9.7 MG/DL (ref 8.6–10.5)
CHLORIDE SERPL-SCNC: 105 MMOL/L (ref 98–107)
CHLORIDE SERPL-SCNC: 106 MMOL/L (ref 98–107)
CHOLEST SERPL-MCNC: 164 MG/DL (ref 0–200)
CO2 SERPL-SCNC: 22.9 MMOL/L (ref 22–29)
CO2 SERPL-SCNC: 23.8 MMOL/L (ref 22–29)
CREAT SERPL-MCNC: 0.7 MG/DL (ref 0.57–1)
CREAT SERPL-MCNC: 0.74 MG/DL (ref 0.57–1)
DEPRECATED RDW RBC AUTO: 42.8 FL (ref 37–54)
EGFRCR SERPLBLD CKD-EPI 2021: 118.2 ML/MIN/1.73
EGFRCR SERPLBLD CKD-EPI 2021: 126.4 ML/MIN/1.73
EOSINOPHIL # BLD AUTO: 0.08 10*3/MM3 (ref 0–0.4)
EOSINOPHIL NFR BLD AUTO: 0.8 % (ref 0.3–6.2)
ERYTHROCYTE [DISTWIDTH] IN BLOOD BY AUTOMATED COUNT: 12.5 % (ref 12.3–15.4)
GGT SERPL-CCNC: 56 U/L (ref 5–36)
GGT SERPL-CCNC: 57 U/L (ref 5–36)
GLOBULIN UR ELPH-MCNC: 2.9 GM/DL
GLOBULIN UR ELPH-MCNC: 3 GM/DL
GLUCOSE SERPL-MCNC: 111 MG/DL (ref 65–99)
GLUCOSE SERPL-MCNC: 114 MG/DL (ref 65–99)
HCT VFR BLD AUTO: 41.7 % (ref 34–46.6)
HDLC SERPL-MCNC: 46 MG/DL (ref 40–60)
HGB BLD-MCNC: 13.9 G/DL (ref 12–15.9)
IMM GRANULOCYTES # BLD AUTO: 0.04 10*3/MM3 (ref 0–0.05)
IMM GRANULOCYTES NFR BLD AUTO: 0.4 % (ref 0–0.5)
LDLC SERPL CALC-MCNC: 89 MG/DL (ref 0–100)
LDLC/HDLC SERPL: 1.84 {RATIO}
LYMPHOCYTES # BLD AUTO: 2.72 10*3/MM3 (ref 0.7–3.1)
LYMPHOCYTES NFR BLD AUTO: 26.5 % (ref 19.6–45.3)
MCH RBC QN AUTO: 31 PG (ref 26.6–33)
MCHC RBC AUTO-ENTMCNC: 33.3 G/DL (ref 31.5–35.7)
MCV RBC AUTO: 92.9 FL (ref 79–97)
MONOCYTES # BLD AUTO: 0.39 10*3/MM3 (ref 0.1–0.9)
MONOCYTES NFR BLD AUTO: 3.8 % (ref 5–12)
NEUTROPHILS NFR BLD AUTO: 68.2 % (ref 42.7–76)
NEUTROPHILS NFR BLD AUTO: 7.02 10*3/MM3 (ref 1.7–7)
NRBC BLD AUTO-RTO: 0 /100 WBC (ref 0–0.2)
PLATELET # BLD AUTO: 337 10*3/MM3 (ref 140–450)
PMV BLD AUTO: 11.5 FL (ref 6–12)
POTASSIUM SERPL-SCNC: 3.9 MMOL/L (ref 3.5–5.2)
POTASSIUM SERPL-SCNC: 4 MMOL/L (ref 3.5–5.2)
PROT SERPL-MCNC: 7.3 G/DL (ref 6–8.5)
PROT SERPL-MCNC: 7.4 G/DL (ref 6–8.5)
RBC # BLD AUTO: 4.49 10*6/MM3 (ref 3.77–5.28)
RPR SER QL: NORMAL
SODIUM SERPL-SCNC: 136 MMOL/L (ref 136–145)
SODIUM SERPL-SCNC: 138 MMOL/L (ref 136–145)
TRIGL SERPL-MCNC: 166 MG/DL (ref 0–150)
VLDLC SERPL-MCNC: 29 MG/DL (ref 5–40)
WBC NRBC COR # BLD AUTO: 10.28 10*3/MM3 (ref 3.4–10.8)

## 2025-04-22 PROCEDURE — 80061 LIPID PANEL: CPT

## 2025-04-22 PROCEDURE — 36415 COLL VENOUS BLD VENIPUNCTURE: CPT

## 2025-04-22 PROCEDURE — 80053 COMPREHEN METABOLIC PANEL: CPT

## 2025-04-22 PROCEDURE — 82977 ASSAY OF GGT: CPT

## 2025-04-22 PROCEDURE — 82306 VITAMIN D 25 HYDROXY: CPT

## 2025-04-22 PROCEDURE — 85025 COMPLETE CBC W/AUTO DIFF WBC: CPT

## 2025-04-22 PROCEDURE — 86592 SYPHILIS TEST NON-TREP QUAL: CPT

## 2025-04-22 PROCEDURE — 99213 OFFICE O/P EST LOW 20 MIN: CPT | Performed by: PHYSICIAN ASSISTANT

## 2025-04-22 NOTE — PROGRESS NOTES
Follow Up Office Visit    Date: 2025   Patient Name: Cordelia Kingsley  : 2005   MRN: [id number]     Chief Complaint:    Chief Complaint   Patient presents with    Hard Bumps      Hard bums on both thighs. Lines not round. She doesn't know when they started.        History of Present Illness:   Cordelia Kingsley is a 20 y.o. female.   History of Present Illness  The patient presents for evaluation of hives.    She is under the care of an allergist, Dr. Shahrzad Yang, for urticaria and has been experiencing hives on both thighs, which are atypical in nature. The hives are not red but are hard to touch. She reports that her urticaria has been progressively worsening. Significant skin swelling was noted last week, particularly on her thighs, with a noticeable difference in size, resembling the size of an egg. The patient has not experienced any cold symptoms or fevers. No unusual food consumption or insect/tick bites have been reported. Concern about the possibility of cancer is expressed due to her grandmother's history of the disease. Current medications include trazodone and a weight loss medication, both started recently.    Fatigue and leg pain are also reported, described as a sensation of squeezing around the thigh and knee. Compression socks have been used intermittently, and physical therapy was attended yesterday. Trazodone has been beneficial in improving sleep quality.    FAMILY HISTORY  Her father is currently in the process of being diagnosed with fibromyalgia. Her grandparents have high blood pressure and cholesterol. Her grandmother has diabetes and has had breast cancer.    Shahrzad Yang MD allergist. Fax labs.    Subjective    Review of systems:  Review of Systems     I have reviewed and the following portions of the patient's history were updated as appropriate: past family history, past medical history, past social history, past surgical history and problem list.    Medications:  "    Current Outpatient Medications:     famotidine (Pepcid) 20 MG tablet, Take 1 tablet by mouth 2 (Two) Times a Day., Disp: 30 tablet, Rfl: 3    fexofenadine (ALLEGRA) 180 MG tablet, Take 1 tablet by mouth 3 (Three) Times a Day., Disp: , Rfl:     naproxen (NAPROSYN) 500 MG tablet, Take 1 tablet by mouth., Disp: , Rfl:     phentermine 15 MG capsule, Take 1 capsule by mouth Every Morning., Disp: 30 capsule, Rfl: 2    Sprintec 28 0.25-35 MG-MCG per tablet, , Disp: , Rfl:     traZODone (DESYREL) 50 MG tablet, Take 1 tablet by mouth Every Night., Disp: 30 tablet, Rfl: 2    Allergies:   Allergies   Allergen Reactions    Amoxicillin Rash    Penicillin G Rash       Objective   Vital Signs:   Vitals:    04/22/25 1055   BP: 120/82   Pulse: 91   Temp: 98 °F (36.7 °C)   TempSrc: Infrared   SpO2: 99%   Weight: 84.4 kg (186 lb)   Height: 152.4 cm (60\")     Body mass index is 36.33 kg/m².          Physical Exam:   Physical Exam  Vitals and nursing note reviewed.   Constitutional:       Appearance: Normal appearance.   HENT:      Head: Normocephalic and atraumatic.   Skin:     Comments: Two raised areas, one on each thigh, no erythema. Consistent with urticarial lesions.   Neurological:      Mental Status: She is alert.          Procedures     Assessment / Plan    Assessment/Plan:   Diagnoses and all orders for this visit:    1. Urticaria (Primary)  -     Comprehensive metabolic panel; Future  -     CBC w AUTO Differential; Future  -     RPR; Future    2. Elevated transaminase level  -     Comprehensive metabolic panel; Future  -     Gamma GT; Future  -     Microalbumin / Creatinine Urine Ratio - Urine, Clean Catch; Future       Assessment & Plan  1. Urticaria.  - Reports localized hives that are hard to the touch, with noticeable swelling in the thighs.  - No direct reaction to current medications, including trazodone and weight loss medication.  - Complete blood count ordered for further evaluation; results to be faxed to " allergist Dr. Estela Yang.  - Follow-up with allergist     2. Fatigue.  - Persistent fatigue noted; trazodone has been effective in improving sleep.  - No recent cold symptoms or fevers reported.  - Continues trazodone for sleep management.    3. Leg pain.  - Describes leg pain around thigh and knee, feeling like squeezing and pulling.  - Compression socks have been used; attended physical therapy recently.  - No unusual physical activity or dietary changes noted.    4. Cancer concern.  - Expressed concern about hard bumps due to family history of cancer.  - No insect bites or tick bites observed.  - Monitoring for any changes in the bumps and overall health.      Follow Up:   Return for Next scheduled follow up.    Patient or patient representative verbalized consent for the use of Ambient Listening during the visit with  Cuca Marcelo PA-C for chart documentation. 4/22/2025  11:23 EDT    Cuca Marcelo PA-C   [facility] [place]

## 2025-04-29 DIAGNOSIS — E55.9 VITAMIN D DEFICIENCY: Primary | ICD-10-CM

## 2025-04-29 RX ORDER — ERGOCALCIFEROL 1.25 MG/1
50000 CAPSULE, LIQUID FILLED ORAL WEEKLY
Qty: 8 CAPSULE | Refills: 0 | Status: SHIPPED | OUTPATIENT
Start: 2025-04-29

## 2025-05-04 DIAGNOSIS — L50.3 URTICARIA, DERMATOGRAPHIC: ICD-10-CM

## 2025-05-05 RX ORDER — FAMOTIDINE 20 MG/1
20 TABLET, FILM COATED ORAL 2 TIMES DAILY
Qty: 30 TABLET | Refills: 3 | Status: SHIPPED | OUTPATIENT
Start: 2025-05-05

## 2025-05-10 ENCOUNTER — OFFICE VISIT (OUTPATIENT)
Dept: FAMILY MEDICINE CLINIC | Facility: CLINIC | Age: 20
End: 2025-05-10
Payer: COMMERCIAL

## 2025-05-10 ENCOUNTER — LAB (OUTPATIENT)
Dept: LAB | Facility: HOSPITAL | Age: 20
End: 2025-05-10
Payer: COMMERCIAL

## 2025-05-10 VITALS
SYSTOLIC BLOOD PRESSURE: 116 MMHG | BODY MASS INDEX: 35.81 KG/M2 | TEMPERATURE: 98.2 F | DIASTOLIC BLOOD PRESSURE: 82 MMHG | HEART RATE: 99 BPM | WEIGHT: 182.4 LBS | OXYGEN SATURATION: 98 % | HEIGHT: 60 IN

## 2025-05-10 DIAGNOSIS — R23.3 EASY BRUISING: ICD-10-CM

## 2025-05-10 DIAGNOSIS — L21.9 SEBORRHEIC DERMATITIS OF SCALP: ICD-10-CM

## 2025-05-10 DIAGNOSIS — R79.89 ELEVATED LFTS: ICD-10-CM

## 2025-05-10 DIAGNOSIS — R20.0 NUMBNESS AND TINGLING IN BOTH HANDS: ICD-10-CM

## 2025-05-10 DIAGNOSIS — R74.01 ELEVATED TRANSAMINASE LEVEL: ICD-10-CM

## 2025-05-10 DIAGNOSIS — L50.3 URTICARIA, DERMATOGRAPHIC: ICD-10-CM

## 2025-05-10 DIAGNOSIS — R20.2 NUMBNESS AND TINGLING IN BOTH HANDS: ICD-10-CM

## 2025-05-10 DIAGNOSIS — L50.3 URTICARIA, DERMATOGRAPHIC: Primary | ICD-10-CM

## 2025-05-10 LAB
ALBUMIN SERPL-MCNC: 4.6 G/DL (ref 3.5–5.2)
ALBUMIN UR-MCNC: 9.1 MG/DL
ALBUMIN/GLOB SERPL: 1.4 G/DL
ALP SERPL-CCNC: 91 U/L (ref 39–117)
ALT SERPL W P-5'-P-CCNC: 80 U/L (ref 1–33)
ANION GAP SERPL CALCULATED.3IONS-SCNC: 12.6 MMOL/L (ref 5–15)
APTT PPP: 30.1 SECONDS (ref 22–39)
AST SERPL-CCNC: 46 U/L (ref 1–32)
BASOPHILS # BLD AUTO: 0.04 10*3/MM3 (ref 0–0.2)
BASOPHILS NFR BLD AUTO: 0.4 % (ref 0–1.5)
BILIRUB SERPL-MCNC: 0.7 MG/DL (ref 0–1.2)
BUN SERPL-MCNC: 5 MG/DL (ref 6–20)
BUN/CREAT SERPL: 8.3 (ref 7–25)
CALCIUM SPEC-SCNC: 9.6 MG/DL (ref 8.6–10.5)
CHLORIDE SERPL-SCNC: 103 MMOL/L (ref 98–107)
CO2 SERPL-SCNC: 23.4 MMOL/L (ref 22–29)
CREAT SERPL-MCNC: 0.6 MG/DL (ref 0.57–1)
CREAT UR-MCNC: 305.3 MG/DL
DEPRECATED RDW RBC AUTO: 40.3 FL (ref 37–54)
EGFRCR SERPLBLD CKD-EPI 2021: 131.2 ML/MIN/1.73
EOSINOPHIL # BLD AUTO: 0.1 10*3/MM3 (ref 0–0.4)
EOSINOPHIL NFR BLD AUTO: 1 % (ref 0.3–6.2)
ERYTHROCYTE [DISTWIDTH] IN BLOOD BY AUTOMATED COUNT: 12 % (ref 12.3–15.4)
GLOBULIN UR ELPH-MCNC: 3.3 GM/DL
GLUCOSE SERPL-MCNC: 91 MG/DL (ref 65–99)
HCT VFR BLD AUTO: 45.2 % (ref 34–46.6)
HGB BLD-MCNC: 15.2 G/DL (ref 12–15.9)
IMM GRANULOCYTES # BLD AUTO: 0.03 10*3/MM3 (ref 0–0.05)
IMM GRANULOCYTES NFR BLD AUTO: 0.3 % (ref 0–0.5)
INR PPP: 1.01 (ref 0.89–1.12)
LYMPHOCYTES # BLD AUTO: 2.94 10*3/MM3 (ref 0.7–3.1)
LYMPHOCYTES NFR BLD AUTO: 28.1 % (ref 19.6–45.3)
MCH RBC QN AUTO: 30.7 PG (ref 26.6–33)
MCHC RBC AUTO-ENTMCNC: 33.6 G/DL (ref 31.5–35.7)
MCV RBC AUTO: 91.3 FL (ref 79–97)
MICROALBUMIN/CREAT UR: 29.8 MG/G (ref 0–29)
MONOCYTES # BLD AUTO: 0.41 10*3/MM3 (ref 0.1–0.9)
MONOCYTES NFR BLD AUTO: 3.9 % (ref 5–12)
NEUTROPHILS NFR BLD AUTO: 6.96 10*3/MM3 (ref 1.7–7)
NEUTROPHILS NFR BLD AUTO: 66.3 % (ref 42.7–76)
NRBC BLD AUTO-RTO: 0 /100 WBC (ref 0–0.2)
PLATELET # BLD AUTO: 369 10*3/MM3 (ref 140–450)
PMV BLD AUTO: 11.6 FL (ref 6–12)
POTASSIUM SERPL-SCNC: 4.2 MMOL/L (ref 3.5–5.2)
PROT SERPL-MCNC: 7.9 G/DL (ref 6–8.5)
PROTHROMBIN TIME: 13.9 SECONDS (ref 12.2–15.3)
RBC # BLD AUTO: 4.95 10*6/MM3 (ref 3.77–5.28)
SODIUM SERPL-SCNC: 139 MMOL/L (ref 136–145)
WBC NRBC COR # BLD AUTO: 10.48 10*3/MM3 (ref 3.4–10.8)

## 2025-05-10 PROCEDURE — 85025 COMPLETE CBC W/AUTO DIFF WBC: CPT

## 2025-05-10 PROCEDURE — 82570 ASSAY OF URINE CREATININE: CPT

## 2025-05-10 PROCEDURE — 85610 PROTHROMBIN TIME: CPT

## 2025-05-10 PROCEDURE — 85730 THROMBOPLASTIN TIME PARTIAL: CPT

## 2025-05-10 PROCEDURE — 82043 UR ALBUMIN QUANTITATIVE: CPT

## 2025-05-10 PROCEDURE — 80053 COMPREHEN METABOLIC PANEL: CPT

## 2025-05-10 RX ORDER — KETOCONAZOLE 20 MG/ML
SHAMPOO, SUSPENSION TOPICAL
Qty: 120 ML | Refills: 0 | Status: SHIPPED | OUTPATIENT
Start: 2025-05-12

## 2025-05-10 RX ORDER — SPIRONOLACTONE 50 MG/1
1 TABLET, FILM COATED ORAL DAILY
COMMUNITY
Start: 2025-04-23 | End: 2025-05-10 | Stop reason: SINTOL

## 2025-05-10 RX ORDER — HYDROXYZINE HYDROCHLORIDE 25 MG/1
25 TABLET, FILM COATED ORAL EVERY 6 HOURS PRN
Qty: 30 TABLET | Refills: 5 | Status: SHIPPED | OUTPATIENT
Start: 2025-05-10

## 2025-05-10 RX ORDER — HYDROXYZINE HYDROCHLORIDE 25 MG/1
25 TABLET, FILM COATED ORAL EVERY 6 HOURS PRN
Qty: 30 TABLET | Refills: 5 | Status: SHIPPED | OUTPATIENT
Start: 2025-05-10 | End: 2025-05-10

## 2025-05-10 NOTE — ASSESSMENT & PLAN NOTE
Patient has had 1 episode of easy bruising.  Will check PT and INR.  Will also check a CBC.  RTC/ED precautions given.

## 2025-05-10 NOTE — ASSESSMENT & PLAN NOTE
Appears to use in the fingers.  Could be secondary carpal tunnel syndrome.  None of the provoking maneuvers for carpal tunnel reproduce the pain symptoms.  Some of the symptoms could be related to vitamin D deficiency which may improve since she has started taking the vitamin D supplement.  Recent labs did not show that she was low on vitamin B12.  Patient can use the wrist braces for carpal tunnel wearing them nightly.  Order EMG for further evaluation.  RTC/ED precautions given.

## 2025-05-10 NOTE — ASSESSMENT & PLAN NOTE
Findings on concerning for seborrheic dermatitis.  Will treat with ketoconazole shampoo.  RTC/ED precautions given.

## 2025-05-10 NOTE — PROGRESS NOTES
Cordelia Kingsley is a 20 y.o. female who presents today for Fingertip pain, Urticaria, Scalp Burning , and Bleeding/Bruising (Bruise on breast that showed up and was gone the next day)      HPI     Patient has burning pain in her fingers of both hands and travels down into the hand. Pain comes and goes and lasts a couple of minutes. It occurs when she is doing things with her hands like typing, writing, or sorting papers. If she stops the activity pain will resolve after a couple of minutes. Hands do not swell. If she keeps doing the activity it will worsen and then eventually go away. She has not been taking any medication for this. She has tried changing her mouse and keyboard but this has not helped.     Patient continues to have itching and hives. She has been diagnosed with dermatographic urticaria. She is seeing an allergy specialist and they have her taking Allegra up to 4 times a day now and this has not helped. She has tried other antihistamines and they have made her too sleepy. She states the Allegra is the thing that has not made her sleepy. She also has some dry itchy flaky spots on her scalp that comes and goes. It is currently not there. It can burn at times. Patient has been prescribed famotidine but has not been taking it.     She had a bruise on her chest that appeared with no injury 3 days ago and then resolved. She does not have any bruising anywhere else and has not noticed easy bleeding.     Review of Systems   Constitutional:  Negative for fever and unexpected weight loss.   HENT:  Negative for congestion, ear pain and sore throat.    Eyes:  Negative for visual disturbance.   Respiratory:  Negative for cough, shortness of breath and wheezing.    Cardiovascular:  Negative for chest pain and palpitations.   Gastrointestinal:  Negative for abdominal pain, blood in stool, constipation, diarrhea, nausea, vomiting and GERD.   Endocrine: Negative for polydipsia and polyuria.   Genitourinary:  Negative  "for difficulty urinating.   Musculoskeletal:  Positive for arthralgias. Negative for joint swelling.   Skin:  Positive for dry skin, rash and bruise. Negative for skin lesions.   Allergic/Immunologic: Negative for environmental allergies.   Neurological:  Positive for numbness. Negative for seizures and syncope.   Hematological:  Does not bruise/bleed easily.   Psychiatric/Behavioral:  Negative for suicidal ideas.         The following portions of the patient's history were reviewed and updated as appropriate: allergies, current medications, past family history, past medical history, past social history, past surgical history and problem list.    Current Outpatient Medications on File Prior to Visit   Medication Sig Dispense Refill    famotidine (PEPCID) 20 MG tablet TAKE 1 TABLET BY MOUTH 2 TIMES A DAY 30 tablet 3    fexofenadine (ALLEGRA) 180 MG tablet Take 1 tablet by mouth 3 (Three) Times a Day.      naproxen (NAPROSYN) 500 MG tablet Take 1 tablet by mouth.      phentermine 15 MG capsule Take 1 capsule by mouth Every Morning. 30 capsule 2    Sprintec 28 0.25-35 MG-MCG per tablet       traZODone (DESYREL) 50 MG tablet Take 1 tablet by mouth Every Night. 30 tablet 2    vitamin D (ERGOCALCIFEROL) 1.25 MG (41176 UT) capsule capsule Take 1 capsule by mouth 1 (One) Time Per Week. 8 capsule 0    [DISCONTINUED] spironolactone (ALDACTONE) 50 MG tablet Take 1 tablet by mouth Daily. (Patient not taking: Reported on 5/10/2025)       No current facility-administered medications on file prior to visit.       Allergies   Allergen Reactions    Amoxicillin Rash    Penicillin G Rash        Visit Vitals  /82   Pulse 99   Temp 98.2 °F (36.8 °C) (Infrared)   Ht 152.4 cm (60\")   Wt 82.7 kg (182 lb 6.4 oz)   SpO2 98%   BMI 35.62 kg/m²        Physical Exam  Constitutional:       General: She is not in acute distress.     Appearance: She is well-developed. She is not diaphoretic.   HENT:      Head: Atraumatic.   Cardiovascular:    "   Rate and Rhythm: Normal rate and regular rhythm.      Heart sounds: Normal heart sounds. No murmur heard.     No friction rub. No gallop.   Pulmonary:      Effort: Pulmonary effort is normal. No respiratory distress.      Breath sounds: Normal breath sounds. No stridor. No wheezing, rhonchi or rales.   Musculoskeletal:      Right wrist: Normal.      Left wrist: Normal.      Right hand: Normal.      Left hand: Normal.      Cervical back: Normal range of motion and neck supple.      Comments: Symptoms could not be reproduced in clinic today.    Skin:     General: Skin is warm and dry.      Findings: Rash (papulosquamous rash with some scaling on the posterior scalp) present.             Comments: Hives where ever patient scratches.    Neurological:      Mental Status: She is alert and oriented to person, place, and time.   Psychiatric:         Behavior: Behavior normal.          Results for orders placed or performed in visit on 04/22/25   Lipid Panel    Collection Time: 04/22/25 11:21 AM    Specimen: Blood   Result Value Ref Range    Total Cholesterol 164 0 - 200 mg/dL    Triglycerides 166 (H) 0 - 150 mg/dL    HDL Cholesterol 46 40 - 60 mg/dL    LDL Cholesterol  89 0 - 100 mg/dL    VLDL Cholesterol 29 5 - 40 mg/dL    LDL/HDL Ratio 1.84    Vitamin D,25-Hydroxy    Collection Time: 04/22/25 11:21 AM    Specimen: Blood   Result Value Ref Range    25 Hydroxy, Vitamin D 9.4 (L) 30.0 - 100.0 ng/ml   Comprehensive metabolic panel    Collection Time: 04/22/25 11:21 AM    Specimen: Blood   Result Value Ref Range    Glucose 111 (H) 65 - 99 mg/dL    BUN 6 6 - 20 mg/dL    Creatinine 0.70 0.57 - 1.00 mg/dL    Sodium 136 136 - 145 mmol/L    Potassium 3.9 3.5 - 5.2 mmol/L    Chloride 106 98 - 107 mmol/L    CO2 22.9 22.0 - 29.0 mmol/L    Calcium 9.7 8.6 - 10.5 mg/dL    Total Protein 7.4 6.0 - 8.5 g/dL    Albumin 4.4 3.5 - 5.2 g/dL    ALT (SGPT) 50 (H) 1 - 33 U/L    AST (SGOT) 34 (H) 1 - 32 U/L    Alkaline Phosphatase 83 39 - 117  U/L    Total Bilirubin 0.4 0.0 - 1.2 mg/dL    Globulin 3.0 gm/dL    A/G Ratio 1.5 g/dL    BUN/Creatinine Ratio 8.6 7.0 - 25.0    Anion Gap 7.1 5.0 - 15.0 mmol/L    eGFR 126.4 >60.0 mL/min/1.73   Gamma GT    Collection Time: 04/22/25 11:21 AM    Specimen: Blood   Result Value Ref Range    GGT 57 (H) 5 - 36 U/L   RPR    Collection Time: 04/22/25 11:21 AM    Specimen: Blood   Result Value Ref Range    RPR Non-Reactive Non-Reactive   CBC Auto Differential    Collection Time: 04/22/25 11:21 AM    Specimen: Blood   Result Value Ref Range    WBC 10.28 3.40 - 10.80 10*3/mm3    RBC 4.49 3.77 - 5.28 10*6/mm3    Hemoglobin 13.9 12.0 - 15.9 g/dL    Hematocrit 41.7 34.0 - 46.6 %    MCV 92.9 79.0 - 97.0 fL    MCH 31.0 26.6 - 33.0 pg    MCHC 33.3 31.5 - 35.7 g/dL    RDW 12.5 12.3 - 15.4 %    RDW-SD 42.8 37.0 - 54.0 fl    MPV 11.5 6.0 - 12.0 fL    Platelets 337 140 - 450 10*3/mm3    Neutrophil % 68.2 42.7 - 76.0 %    Lymphocyte % 26.5 19.6 - 45.3 %    Monocyte % 3.8 (L) 5.0 - 12.0 %    Eosinophil % 0.8 0.3 - 6.2 %    Basophil % 0.3 0.0 - 1.5 %    Immature Grans % 0.4 0.0 - 0.5 %    Neutrophils, Absolute 7.02 (H) 1.70 - 7.00 10*3/mm3    Lymphocytes, Absolute 2.72 0.70 - 3.10 10*3/mm3    Monocytes, Absolute 0.39 0.10 - 0.90 10*3/mm3    Eosinophils, Absolute 0.08 0.00 - 0.40 10*3/mm3    Basophils, Absolute 0.03 0.00 - 0.20 10*3/mm3    Immature Grans, Absolute 0.04 0.00 - 0.05 10*3/mm3    nRBC 0.0 0.0 - 0.2 /100 WBC   Comprehensive Metabolic Panel    Collection Time: 04/22/25 11:21 AM    Specimen: Blood   Result Value Ref Range    Glucose 114 (H) 65 - 99 mg/dL    BUN 6 6 - 20 mg/dL    Creatinine 0.74 0.57 - 1.00 mg/dL    Sodium 138 136 - 145 mmol/L    Potassium 4.0 3.5 - 5.2 mmol/L    Chloride 105 98 - 107 mmol/L    CO2 23.8 22.0 - 29.0 mmol/L    Calcium 9.6 8.6 - 10.5 mg/dL    Total Protein 7.3 6.0 - 8.5 g/dL    Albumin 4.4 3.5 - 5.2 g/dL    ALT (SGPT) 49 (H) 1 - 33 U/L    AST (SGOT) 33 (H) 1 - 32 U/L    Alkaline Phosphatase 83 39 -  117 U/L    Total Bilirubin 0.4 0.0 - 1.2 mg/dL    Globulin 2.9 gm/dL    A/G Ratio 1.5 g/dL    BUN/Creatinine Ratio 8.1 7.0 - 25.0    Anion Gap 9.2 5.0 - 15.0 mmol/L    eGFR 118.2 >60.0 mL/min/1.73   Gamma GT    Collection Time: 04/22/25 11:21 AM    Specimen: Blood   Result Value Ref Range    GGT 56 (H) 5 - 36 U/L        Problems Addressed this Visit          Gastrointestinal Abdominal     Elevated LFTs    LFT elevation from unknown cause.  Liver function numbers have been trending towards normal.  Will recheck today.         Relevant Orders    Comprehensive Metabolic Panel       Skin    Urticaria, dermatographic - Primary    Dermatographic urticaria.  She has been following with an allergy specialist but has not gotten relief of symptoms.  She will continue Allegra and start the famotidine she was prescribed.  If she does not see relief of symptoms with these medications she can use hydroxyzine as needed for further relief.  Patient was given referral to dermatology for further evaluation and treatment.  RTC/ED precautions given.         Relevant Medications    hydrOXYzine (ATARAX) 25 MG tablet    ketoconazole (NIZORAL) 2 % shampoo (Start on 5/12/2025)    Other Relevant Orders    Ambulatory Referral to Dermatology    Comprehensive Metabolic Panel    Seborrheic dermatitis of scalp    Findings on concerning for seborrheic dermatitis.  Will treat with ketoconazole shampoo.  RTC/ED precautions given.         Relevant Medications    ketoconazole (NIZORAL) 2 % shampoo (Start on 5/12/2025)    Easy bruising    Patient has had 1 episode of easy bruising.  Will check PT and INR.  Will also check a CBC.  RTC/ED precautions given.         Relevant Orders    Protime-INR (Completed)    aPTT (Completed)    CBC & Differential       Symptoms and Signs    Numbness and tingling in both hands    Appears to use in the fingers.  Could be secondary carpal tunnel syndrome.  None of the provoking maneuvers for carpal tunnel reproduce the  pain symptoms.  Some of the symptoms could be related to vitamin D deficiency which may improve since she has started taking the vitamin D supplement.  Recent labs did not show that she was low on vitamin B12.  Patient can use the wrist braces for carpal tunnel wearing them nightly.  Order EMG for further evaluation.  RTC/ED precautions given.         Relevant Orders    EMG & Nerve Conduction Test    Comprehensive Metabolic Panel    CBC & Differential     Diagnoses         Codes Comments      Urticaria, dermatographic    -  Primary ICD-10-CM: L50.3  ICD-9-CM: 708.3       Numbness and tingling in both hands     ICD-10-CM: R20.0, R20.2  ICD-9-CM: 782.0       Elevated LFTs     ICD-10-CM: R79.89  ICD-9-CM: 790.6       Easy bruising     ICD-10-CM: R23.3  ICD-9-CM: 782.7       Seborrheic dermatitis of scalp     ICD-10-CM: L21.9  ICD-9-CM: 690.18             Return if symptoms worsen or fail to improve.    42 minutes was spent on this patient encounter which included history taking, physical exam, answering patient questions, counseling, discussing treatment plan, placing orders, and documentation.    Patrick Baker MD   5/10/2025

## 2025-05-10 NOTE — ASSESSMENT & PLAN NOTE
Dermatographic urticaria.  She has been following with an allergy specialist but has not gotten relief of symptoms.  She will continue Allegra and start the famotidine she was prescribed.  If she does not see relief of symptoms with these medications she can use hydroxyzine as needed for further relief.  Patient was given referral to dermatology for further evaluation and treatment.  RTC/ED precautions given.

## 2025-05-10 NOTE — ASSESSMENT & PLAN NOTE
LFT elevation from unknown cause.  Liver function numbers have been trending towards normal.  Will recheck today.

## 2025-05-19 ENCOUNTER — TREATMENT (OUTPATIENT)
Dept: PHYSICAL THERAPY | Facility: CLINIC | Age: 20
End: 2025-05-19
Payer: COMMERCIAL

## 2025-05-19 DIAGNOSIS — M25.562 CHRONIC PAIN OF BOTH KNEES: Primary | ICD-10-CM

## 2025-05-19 DIAGNOSIS — M25.561 CHRONIC PAIN OF BOTH KNEES: Primary | ICD-10-CM

## 2025-05-19 DIAGNOSIS — G89.29 CHRONIC PAIN OF BOTH KNEES: Primary | ICD-10-CM

## 2025-05-19 PROCEDURE — 97530 THERAPEUTIC ACTIVITIES: CPT

## 2025-05-19 NOTE — PROGRESS NOTES
Physical Therapy Daily Treatment Note  [facility] [place]  [address], [address]  [place] [address]       Patient: Cordelia Kingsley   : 2005  Diagnosis/ICD-10 Code:  Chronic pain of both knees [M25.561, M25.562, G89.29]  Referring practitioner: Josh Tavera DO  Date of Initial Visit: Type: THERAPY  Noted: 2025  Today's Date: 2025  Patient seen for 2 sessions         Visit Diagnoses:    ICD-10-CM ICD-9-CM   1. Chronic pain of both knees  M25.561 719.46    M25.562 338.29    G89.29        Subjective   Cordelia Kingsley reports: her knees have been feeling better. She notes that new issues have popped up. She describes twitching above her kneecap in both legs (more noticeable in her LLE) with no consistent pattern or precipitating event. She describes a pain in her arms that feels like somebody is tearing them apart (more noticeable in her RUE) with an inconsistent pattern and no precipitating event or activity. She also notes a burning sensation in her hands that can be aggravated with typing or when she's sleeping, she notes this pain hurts her hands and she has to stop what she's doing to allow it to resolve.    Objective          Palpation   Left   Hypertonic in the biceps, wrist extensors and wrist flexors.   Tenderness of the biceps.   Trigger point to biceps.     Right   Hypertonic in the biceps, wrist extensors and wrist flexors.     Tests     Left Elbow   Negative Cozen's.     Right Elbow   Negative Cozen's.     Left Wrist/Hand   Negative Phalen's sign and Tinel's sign (medial nerve).     Right Wrist/Hand   Negative Phalen's sign and Tinel's sign (medial nerve).         See Exercise, Manual, and Modality Logs for complete treatment.       Assessment/Plan  Patient participated in further assessment of arm and wrist symptoms for bilateral upper extremities.  She does not present with any clinical signs of carpal tunnel syndrome.  She has a recent family member diagnosed with  fibromyalgia which would potentially explain the new cluster symptoms that she has been experiencing. I will see her back in a month for progress with new onset of symptoms. Will continue to progress per her tolerance.     Progress per Plan of Care and Progress strengthening /stabilization /functional activity           Timed:         Manual Therapy:         mins  77940;     Therapeutic Exercise:         mins  20603;     Neuromuscular Tamiko:        mins  35118;    Therapeutic Activity:     30     mins  61175;     Gait Training:           mins  02162;     Ultrasound:          mins  15067;    Ionto                                   mins   28179  Self Care                            mins   82963  Canalith Repos         mins 55923      Un-Timed:  Electrical Stimulation:         mins  47289 ( );  Dry Needling          mins self-pay  Traction          mins 94762      Timed Treatment:   30   mins   Total Treatment:     30   mins      Mariaa Lomas PT  Physical Therapist

## 2025-05-22 DIAGNOSIS — R74.8 ELEVATED LIVER ENZYMES: Primary | ICD-10-CM

## 2025-05-30 ENCOUNTER — OFFICE VISIT (OUTPATIENT)
Dept: FAMILY MEDICINE CLINIC | Facility: CLINIC | Age: 20
End: 2025-05-30
Payer: COMMERCIAL

## 2025-05-30 VITALS
TEMPERATURE: 98.2 F | OXYGEN SATURATION: 98 % | SYSTOLIC BLOOD PRESSURE: 128 MMHG | WEIGHT: 184.6 LBS | DIASTOLIC BLOOD PRESSURE: 80 MMHG | HEART RATE: 87 BPM | BODY MASS INDEX: 36.05 KG/M2

## 2025-05-30 DIAGNOSIS — E66.09 CLASS 2 OBESITY DUE TO EXCESS CALORIES WITHOUT SERIOUS COMORBIDITY WITH BODY MASS INDEX (BMI) OF 36.0 TO 36.9 IN ADULT: ICD-10-CM

## 2025-05-30 DIAGNOSIS — E66.812 CLASS 2 OBESITY DUE TO EXCESS CALORIES WITHOUT SERIOUS COMORBIDITY WITH BODY MASS INDEX (BMI) OF 36.0 TO 36.9 IN ADULT: ICD-10-CM

## 2025-05-30 DIAGNOSIS — M62.838 MUSCLE SPASMS OF LOWER EXTREMITY: ICD-10-CM

## 2025-05-30 DIAGNOSIS — E11.9 TYPE 2 DIABETES MELLITUS WITHOUT COMPLICATION, WITHOUT LONG-TERM CURRENT USE OF INSULIN: Primary | ICD-10-CM

## 2025-05-30 DIAGNOSIS — Z51.81 THERAPEUTIC DRUG MONITORING: ICD-10-CM

## 2025-05-30 LAB
EXPIRATION DATE: ABNORMAL
HBA1C MFR BLD: 5.9 % (ref 4.5–5.7)
Lab: ABNORMAL

## 2025-05-30 NOTE — PROGRESS NOTES
Follow Up Office Visit      Patient Name: Cordelia Kingsley  : 2005   MRN: [id number]     Chief Complaint:    Chief Complaint   Patient presents with    Urticaria     PT states it comes and goes. Medicated shampoo has helped but pt is still experiencing discomfort    Knee Pain     Mainly right knee but left also hurts.     Spasms     In thighs and forearms. PT states it happens randomly        History of Present Illness: Cordelia Kingsley is a 20 y.o. female who is here today to follow up with urticaria, right knee pain and left knee pain but the right is worse, spasms in her thighs and forearms.      Patient has been having spasms in her thighs and forearms.  Her muscles twitching is visible.  This lasted for maybe an hour or 2 and then goes away.  No pain.  Is not worse with activity.    Patient also has right knee pain.  It hurts when just sitting and symptoms with movement.  Has lateral knee pain and anterior knee pain.    He started having urticaria about 8 months ago.  Started birth control last August and then last September her urticaria started.  Off of naproxen.  She just darted trazodone for insomnia.    Patient has been on high dose of Allegra without any improvement in urticaria.  Taking hydroxyzine and tried aloe vera.    Patient currently doing well on phentermine for weight loss.  She is lost 4 pounds.  She says that it is affecting her appetite.    Physical exam: Patient's mood is appropriate and affect appropriate.  Right knee exam shows crepitus with passive range of motion.  Patellar grind test positive for some pain.  No palpatory pain.    Subjective        I have reviewed and the following portions of the patient's history were updated as appropriate: past family history, past medical history, past social history, past surgical history and problem list.    Medications:     Current Outpatient Medications:     famotidine (PEPCID) 20 MG tablet, TAKE 1 TABLET BY MOUTH 2 TIMES A DAY, Disp:  30 tablet, Rfl: 3    fexofenadine (ALLEGRA) 180 MG tablet, Take 1 tablet by mouth 3 (Three) Times a Day., Disp: , Rfl:     hydrOXYzine (ATARAX) 25 MG tablet, Take 1 tablet by mouth Every 6 (Six) Hours As Needed for Itching., Disp: 30 tablet, Rfl: 5    ketoconazole (NIZORAL) 2 % shampoo, Apply 5-10ml to wet scalp and lather. Leave on for 3-5 minutes and then rinse. Repeat twice for 2-4 weeks., Disp: 120 mL, Rfl: 0    phentermine 15 MG capsule, Take 1 capsule by mouth Every Morning., Disp: 30 capsule, Rfl: 2    traZODone (DESYREL) 50 MG tablet, Take 1 tablet by mouth Every Night., Disp: 30 tablet, Rfl: 2    vitamin D (ERGOCALCIFEROL) 1.25 MG (84149 UT) capsule capsule, Take 1 capsule by mouth 1 (One) Time Per Week., Disp: 8 capsule, Rfl: 0    Allergies:   Allergies   Allergen Reactions    Spironolactone Hives    Amoxicillin Rash    Penicillin G Rash       Objective     Physical Exam: Please see above  Vital Signs:   Vitals:    05/30/25 1007   BP: 128/80   Pulse: 87   Temp: 98.2 °F (36.8 °C)   TempSrc: Infrared   SpO2: 98%   Weight: 83.7 kg (184 lb 9.6 oz)     Body mass index is 36.05 kg/m².          Assessment / Plan      Assessment/Plan:   Diagnoses and all orders for this visit:    1. Type 2 diabetes mellitus without complication, without long-term current use of insulin (Primary)  -     POC Glycosylated Hemoglobin (Hb A1C)    2. Muscle spasms of lower extremity  -     Magnesium; Future    3. Therapeutic drug monitoring    4. Class 2 obesity due to excess calories without serious comorbidity with body mass index (BMI) of 36.0 to 36.9 in adult    Continue phentermine.  Follow-up in 3 months.  Encouraged her to exercise regularly and follow strength training.  Work on portion sizes.  Eat a healthy diet such as vegetables and whole grains     muscle spasms-this could be low magnesium given her weight.  It also would be related to inactivity.  Be medication induced or dehydration induced.  Drink up to 2 L of water per  day.  Will test magnesium level.  Encouraged her to exercise more often.  If symptoms become more frequent or are painful-patient should follow-up sooner than 3 months    Her knee pain-I want her to go back to physical therapy and continue this on a weekly basis.  Looks like she may have a plica, patellar maltracking issue, or IT band issue.  No think PT will help all these concerns and hopefully she will have improvement    Diabetes very stable.  Continue diet control.    Today we reviewed and discussed the patient's controlled substance.  We reviewed their [place] report, previous drug screens, and drug contract.  They have a drug contract and drug screen on file that is current.  They are compliant with follow-ups every 3 months, and will continue to be compliant per the drug contract.     Follow Up:   Return in about 3 months (around 8/30/2025) for weight management .    Josh Tavera DO  [facility] [place]

## 2025-06-03 ENCOUNTER — OFFICE VISIT (OUTPATIENT)
Dept: GASTROENTEROLOGY | Facility: CLINIC | Age: 20
End: 2025-06-03
Payer: COMMERCIAL

## 2025-06-03 ENCOUNTER — LAB (OUTPATIENT)
Dept: LAB | Facility: HOSPITAL | Age: 20
End: 2025-06-03
Payer: COMMERCIAL

## 2025-06-03 VITALS
HEART RATE: 97 BPM | DIASTOLIC BLOOD PRESSURE: 87 MMHG | BODY MASS INDEX: 37.01 KG/M2 | SYSTOLIC BLOOD PRESSURE: 133 MMHG | WEIGHT: 183.6 LBS | HEIGHT: 59 IN

## 2025-06-03 DIAGNOSIS — M62.838 MUSCLE SPASMS OF LOWER EXTREMITY: ICD-10-CM

## 2025-06-03 DIAGNOSIS — R74.8 ELEVATED LIVER ENZYMES: Primary | ICD-10-CM

## 2025-06-03 DIAGNOSIS — R74.8 ELEVATED LIVER ENZYMES: ICD-10-CM

## 2025-06-03 DIAGNOSIS — R79.89 ELEVATED LFTS: ICD-10-CM

## 2025-06-03 DIAGNOSIS — L50.9 URTICARIA: ICD-10-CM

## 2025-06-03 LAB
ALBUMIN SERPL-MCNC: 4.5 G/DL (ref 3.5–5.2)
ALBUMIN/GLOB SERPL: 1.5 G/DL
ALP SERPL-CCNC: 89 U/L (ref 39–117)
ALT SERPL W P-5'-P-CCNC: 123 U/L (ref 1–33)
ANION GAP SERPL CALCULATED.3IONS-SCNC: 10.4 MMOL/L (ref 5–15)
AST SERPL-CCNC: 59 U/L (ref 1–32)
BILIRUB SERPL-MCNC: 0.7 MG/DL (ref 0–1.2)
BUN SERPL-MCNC: 5 MG/DL (ref 6–20)
BUN/CREAT SERPL: 9.3 (ref 7–25)
CALCIUM SPEC-SCNC: 9.5 MG/DL (ref 8.6–10.5)
CHLORIDE SERPL-SCNC: 105 MMOL/L (ref 98–107)
CO2 SERPL-SCNC: 24.6 MMOL/L (ref 22–29)
CREAT SERPL-MCNC: 0.54 MG/DL (ref 0.57–1)
EGFRCR SERPLBLD CKD-EPI 2021: 134.5 ML/MIN/1.73
FERRITIN SERPL-MCNC: 160 NG/ML (ref 13–150)
GLOBULIN UR ELPH-MCNC: 3 GM/DL
GLUCOSE SERPL-MCNC: 85 MG/DL (ref 65–99)
HETEROPH AB SER QL LA: NEGATIVE
INR PPP: 1.01 (ref 0.89–1.12)
IRON 24H UR-MRATE: 92 MCG/DL (ref 37–145)
IRON SATN MFR SERPL: 22 % (ref 20–50)
MAGNESIUM SERPL-MCNC: 2.1 MG/DL (ref 1.6–2.6)
POTASSIUM SERPL-SCNC: 3.8 MMOL/L (ref 3.5–5.2)
PROT SERPL-MCNC: 7.5 G/DL (ref 6–8.5)
PROTHROMBIN TIME: 13.9 SECONDS (ref 12.2–15.3)
SODIUM SERPL-SCNC: 140 MMOL/L (ref 136–145)
TIBC SERPL-MCNC: 416 MCG/DL (ref 298–536)
TRANSFERRIN SERPL-MCNC: 279 MG/DL (ref 200–360)

## 2025-06-03 PROCEDURE — 86037 ANCA TITER EACH ANTIBODY: CPT

## 2025-06-03 PROCEDURE — 83540 ASSAY OF IRON: CPT

## 2025-06-03 PROCEDURE — 36415 COLL VENOUS BLD VENIPUNCTURE: CPT

## 2025-06-03 PROCEDURE — 86038 ANTINUCLEAR ANTIBODIES: CPT

## 2025-06-03 PROCEDURE — 82104 ALPHA-1-ANTITRYPSIN PHENO: CPT

## 2025-06-03 PROCEDURE — 85610 PROTHROMBIN TIME: CPT

## 2025-06-03 PROCEDURE — 86015 ACTIN ANTIBODY EACH: CPT

## 2025-06-03 PROCEDURE — 86706 HEP B SURFACE ANTIBODY: CPT

## 2025-06-03 PROCEDURE — 82103 ALPHA-1-ANTITRYPSIN TOTAL: CPT

## 2025-06-03 PROCEDURE — 86803 HEPATITIS C AB TEST: CPT

## 2025-06-03 PROCEDURE — 86308 HETEROPHILE ANTIBODY SCREEN: CPT

## 2025-06-03 PROCEDURE — 84466 ASSAY OF TRANSFERRIN: CPT

## 2025-06-03 PROCEDURE — 80053 COMPREHEN METABOLIC PANEL: CPT

## 2025-06-03 PROCEDURE — 86381 MITOCHONDRIAL ANTIBODY EACH: CPT

## 2025-06-03 PROCEDURE — 82728 ASSAY OF FERRITIN: CPT

## 2025-06-03 PROCEDURE — 83735 ASSAY OF MAGNESIUM: CPT

## 2025-06-04 ENCOUNTER — LAB (OUTPATIENT)
Dept: LAB | Facility: HOSPITAL | Age: 20
End: 2025-06-04
Payer: COMMERCIAL

## 2025-06-04 ENCOUNTER — RESULTS FOLLOW-UP (OUTPATIENT)
Dept: FAMILY MEDICINE CLINIC | Facility: CLINIC | Age: 20
End: 2025-06-04
Payer: COMMERCIAL

## 2025-06-04 DIAGNOSIS — R74.8 ELEVATED LIVER ENZYMES: ICD-10-CM

## 2025-06-04 LAB
HBV SURFACE AB SER RIA-ACNC: NORMAL
HCV AB SER QL: NORMAL

## 2025-06-04 PROCEDURE — 82465 ASSAY BLD/SERUM CHOLESTEROL: CPT

## 2025-06-04 PROCEDURE — 82947 ASSAY GLUCOSE BLOOD QUANT: CPT

## 2025-06-04 PROCEDURE — 83010 ASSAY OF HAPTOGLOBIN QUANT: CPT

## 2025-06-04 PROCEDURE — 82172 ASSAY OF APOLIPOPROTEIN: CPT

## 2025-06-04 PROCEDURE — 84450 TRANSFERASE (AST) (SGOT): CPT

## 2025-06-04 PROCEDURE — 84460 ALANINE AMINO (ALT) (SGPT): CPT

## 2025-06-04 PROCEDURE — 82247 BILIRUBIN TOTAL: CPT

## 2025-06-04 PROCEDURE — 83883 ASSAY NEPHELOMETRY NOT SPEC: CPT

## 2025-06-04 PROCEDURE — 84478 ASSAY OF TRIGLYCERIDES: CPT

## 2025-06-04 PROCEDURE — 82977 ASSAY OF GGT: CPT

## 2025-06-05 LAB
ANA SER QL: NEGATIVE
C-ANCA TITR SER IF: NORMAL TITER
MITOCHONDRIA M2 IGG SER-ACNC: <20 UNITS (ref 0–20)
P-ANCA ATYPICAL TITR SER IF: NORMAL TITER
P-ANCA TITR SER IF: NORMAL TITER
SMA IGG SER-ACNC: 3 UNITS (ref 0–19)

## 2025-06-07 PROBLEM — R74.8 ELEVATED LIVER ENZYMES: Status: ACTIVE | Noted: 2025-06-07

## 2025-06-07 LAB
A2 MACROGLOB SERPL-MCNC: 143 MG/DL (ref 110–276)
ALT SERPL W P-5'-P-CCNC: 115 IU/L (ref 0–40)
APO A-I SERPL-MCNC: 127 MG/DL (ref 116–209)
AST SERPL W P-5'-P-CCNC: 48 IU/L (ref 0–40)
BILIRUB SERPL-MCNC: 0.5 MG/DL (ref 0–1.2)
CHOLEST SERPL-MCNC: 173 MG/DL (ref 100–199)
FIBROSIS SCORING:: ABNORMAL
FIBROSIS STAGE SERPL QL: ABNORMAL
GGT SERPL-CCNC: 45 IU/L (ref 0–60)
GLUCOSE SERPL-MCNC: 97 MG/DL (ref 70–99)
HAPTOGLOB SERPL-MCNC: 219 MG/DL (ref 33–278)
LABORATORY COMMENT REPORT: ABNORMAL
LIVER FIBR SCORE SERPL CALC.FIBROSURE: 0.05 (ref 0–0.21)
LIVER STEATOSIS GRADE SERPL QL: ABNORMAL
LIVER STEATOSIS SCORE SERPL: 0.67 (ref 0–0.4)
NASH GRADE SERPL QL: ABNORMAL
NASH INTERPRETATION SERPL-IMP: ABNORMAL
NASH SCORE SERPL: 0.48 (ref 0–0.25)
NASH SCORING: ABNORMAL
STEATOSIS SCORING: ABNORMAL
TEST PERFORMANCE INFO SPEC: ABNORMAL
TEST PERFORMANCE INFO SPEC: ABNORMAL
TRIGL SERPL-MCNC: 148 MG/DL (ref 0–149)

## 2025-06-07 NOTE — PROGRESS NOTES
GASTROENTEROLOGY OFFICE NOTE  Cordelia Kingsley  [id number]  2005      Chief Complaint   Patient presents with    Elevated Liver Enzymes, Uticaria        HISTORY OF PRESENT ILLNESS:  History of Present Illness  The patient is a 20-year-old female presenting for the first visit with elevated liver enzymes, specifically an ALT of 80 and AST of 46, as noted in her labs from 05/10/2025. She also has chronic urticaria and is referred for further evaluation.    She has been experiencing chronic urticaria, and recent blood work over the past 2 to 3 months has revealed fluctuating levels of liver enzymes. This is a new finding that has occurred on more than one occasion. She reports no issues with eating or swallowing, and no symptoms of nausea, vomiting, or hematochezia. Her appetite and weight have remained stable. She does not consume alcohol and reports no family history of liver disease. An ultrasound has not yet been performed. She is currently taking Allegra, Atarax, Nizoral, phentermine, and trazodone, but notes that the elevated liver enzymes were present prior to starting these medications. She reports no fevers, chills, jaundice, or pruritus.    She is currently on phentermine for weight loss, which she reports is effective in curbing her appetite. She is making dietary modifications, including reducing her intake of bread, pasta, rice, and potatoes. She is also considering discontinuing Sprintec, a birth control medication she has not previously used, to assess its impact on her condition.    FAMILY HISTORY  - Negative for liver disease    PAST MEDICAL HISTORY  Past Medical History:    PCOS (polycystic ovarian syndrome)        PAST SURGICAL HISTORY  No past surgical history on file.     MEDICATIONS:    Current Outpatient Medications:     fexofenadine (ALLEGRA) 180 MG tablet, Take 1 tablet by mouth 3 (Three) Times a Day., Disp: , Rfl:     hydrOXYzine (ATARAX) 25 MG tablet, Take 1 tablet by mouth Every 6 (Six)  "Hours As Needed for Itching., Disp: 30 tablet, Rfl: 5    ketoconazole (NIZORAL) 2 % shampoo, Apply 5-10ml to wet scalp and lather. Leave on for 3-5 minutes and then rinse. Repeat twice for 2-4 weeks., Disp: 120 mL, Rfl: 0    phentermine 15 MG capsule, Take 1 capsule by mouth Every Morning., Disp: 30 capsule, Rfl: 2    traZODone (DESYREL) 50 MG tablet, Take 1 tablet by mouth Every Night., Disp: 30 tablet, Rfl: 2    vitamin D (ERGOCALCIFEROL) 1.25 MG (74235 UT) capsule capsule, Take 1 capsule by mouth 1 (One) Time Per Week., Disp: 8 capsule, Rfl: 0    ALLERGIES  is allergic to spironolactone, amoxicillin, and penicillin g.    FAMILY HISTORY:  Cancer-related family history includes Breast cancer in her maternal grandmother.  Colon Cancer-related family history is not on file.    SOCIAL HISTORY  She  reports that she has never smoked. She has never been exposed to tobacco smoke. She has never used smokeless tobacco. She reports that she does not drink alcohol and does not use drugs.   Marital Status: Single  Occupation: Works at the [employer]  Alcohol: Does not drink alcohol  Tobacco: Does not smoke  Lives with her parents.      PHYSICAL EXAM   /87 (BP Location: Right arm, Patient Position: Sitting, Cuff Size: Adult)   Pulse 97   Ht 149.9 cm (59\")   Wt 83.3 kg (183 lb 9.6 oz)   LMP 04/28/2025 (Approximate)   BMI 37.08 kg/m²   General: Alert and oriented x 3. In no apparent or acute distress.  and No stigmata of chronic liver disease  HEENT: Anicteric sclerae. Normal oropharynx  Neck: Supple. Without lymphadenopathy  CV: Regular rate and rhythm, S1, S2  Lungs: Clear to ausculation. Without rales, rhonchi and wheezing  Abdomen:  Soft,non-distended without palpable masses or hepatosplenomeagaly, areas of rebound tenderness or guarding.   Extremeties: without clubbing, cyanosis or edema  Neurologic:  Alert and oriented x 3 without focal motor or sensory deficits  Rectal exam: deferred "       ASSESSMENT/PLAN  Assessment & Plan  1. Elevated liver enzymes:  - Possible causes include drug-induced liver injury, autoimmune, storage diseases  - Discussed possibility of fatty liver disease.  - Order comprehensive blood workup including iron studies, alpha-1 antitrypsin, ANILA, anti-neutrophil cytoplasmic antibody, smooth muscle antibody, liver enzymes, hepatitis B surface antibody, hepatitis C, AKINS, FibroSure, mononucleosis, mitochondrial antibody, and PT/INR.  - Conduct liver ultrasound to assess structural integrity and rule out gallstones or tumors.  - Advise weight loss and discontinuation of unnecessary medications.  - Place standing order for monthly hepatic function panels for the next year.  - Consider liver biopsy if liver enzymes remain persistently abnormal for more than 6 months without a definitive diagnosis.    2. Chronic urticaria:  - No new treatment plan discussed during this visit.    3.  Obesity  - Weight loss encouraged.      Follow-up: 09/2025.      Ervin Gallego MD  6/7/2025   09:53 EDT      Patient or patient representative verbalized consent for the use of Ambient Listening during the visit with  Ervin Gallego MD for chart documentation. 6/7/2025  09:56 EDT    Addendum:  Marked elevation liver enzymes noted on Tab[place] 3, 2025 labs.  ALT is gone from 80 up to 120 units/L.  AST is gone from 46 up to 59 units/L.  Total bilirubin, alkaline phosphatase, total protein and albumin remain relatively unchanged and within normal limits.  Ferritin is elevated with normal saturation suggesting that it is elevated more likely as an acute phase reactant rather than representing hemochromatosis.  Complete results of serology is still pending.  Markers for autoimmune disease thus far negative with negative ANILA, smooth muscle antibody, antimitochondrial antibody and P-ANCA

## 2025-06-12 LAB
A1AT PHENOTYP SERPL IFE: NORMAL
A1AT SERPL-MCNC: 150 MG/DL (ref 100–188)

## 2025-06-24 DIAGNOSIS — E55.9 VITAMIN D DEFICIENCY: ICD-10-CM

## 2025-06-24 RX ORDER — ERGOCALCIFEROL 1.25 MG/1
50000 CAPSULE, LIQUID FILLED ORAL WEEKLY
Qty: 8 CAPSULE | Refills: 0 | Status: SHIPPED | OUTPATIENT
Start: 2025-06-24

## 2025-07-01 ENCOUNTER — HOSPITAL ENCOUNTER (OUTPATIENT)
Dept: ULTRASOUND IMAGING | Facility: HOSPITAL | Age: 20
Discharge: HOME | End: 2025-07-01
Payer: COMMERCIAL

## 2025-07-01 DIAGNOSIS — R74.8 ELEVATED LIVER ENZYMES: ICD-10-CM

## 2025-07-03 ENCOUNTER — HOSPITAL ENCOUNTER (OUTPATIENT)
Dept: ULTRASOUND IMAGING | Facility: HOSPITAL | Age: 20
Discharge: HOME | End: 2025-07-03
Admitting: INTERNAL MEDICINE
Payer: COMMERCIAL

## 2025-07-03 PROCEDURE — 76705 ECHO EXAM OF ABDOMEN: CPT

## 2025-07-03 PROCEDURE — 76981 USE PARENCHYMA: CPT

## 2025-07-09 DIAGNOSIS — F51.01 PRIMARY INSOMNIA: ICD-10-CM

## 2025-07-09 RX ORDER — TRAZODONE HYDROCHLORIDE 50 MG/1
50 TABLET ORAL NIGHTLY
Qty: 30 TABLET | Refills: 2 | Status: SHIPPED | OUTPATIENT
Start: 2025-07-09

## 2025-08-25 ENCOUNTER — LAB (OUTPATIENT)
Dept: LAB | Facility: HOSPITAL | Age: 20
End: 2025-08-25
Payer: COMMERCIAL

## 2025-08-25 DIAGNOSIS — R74.8 ELEVATED LIVER ENZYMES: ICD-10-CM

## 2025-08-25 DIAGNOSIS — E55.9 VITAMIN D DEFICIENCY: ICD-10-CM

## 2025-08-25 LAB
ALBUMIN SERPL-MCNC: 4.2 G/DL (ref 3.5–5.2)
ALP SERPL-CCNC: 79 U/L (ref 39–117)
ALT SERPL W P-5'-P-CCNC: 49 U/L (ref 1–33)
AST SERPL-CCNC: 24 U/L (ref 1–32)
BILIRUB CONJ SERPL-MCNC: 0.3 MG/DL (ref 0–0.3)
BILIRUB INDIRECT SERPL-MCNC: 0.8 MG/DL
BILIRUB SERPL-MCNC: 1.1 MG/DL (ref 0–1.2)
PROT SERPL-MCNC: 6.9 G/DL (ref 6–8.5)

## 2025-08-25 PROCEDURE — 80076 HEPATIC FUNCTION PANEL: CPT

## 2025-08-26 RX ORDER — ERGOCALCIFEROL 1.25 MG/1
50000 CAPSULE, LIQUID FILLED ORAL WEEKLY
Qty: 8 CAPSULE | Refills: 0 | Status: SHIPPED | OUTPATIENT
Start: 2025-08-26